# Patient Record
Sex: MALE | Race: WHITE | Employment: OTHER | URBAN - METROPOLITAN AREA
[De-identification: names, ages, dates, MRNs, and addresses within clinical notes are randomized per-mention and may not be internally consistent; named-entity substitution may affect disease eponyms.]

---

## 2017-05-10 PROBLEM — E11.9 CONTROLLED TYPE 2 DIABETES MELLITUS WITHOUT COMPLICATION, WITHOUT LONG-TERM CURRENT USE OF INSULIN (HCC): Status: ACTIVE | Noted: 2017-05-10

## 2020-03-19 PROBLEM — I25.5 ISCHEMIC CARDIOMYOPATHY: Status: ACTIVE | Noted: 2017-06-30

## 2020-03-19 PROBLEM — E11.9 CONTROLLED TYPE 2 DIABETES MELLITUS WITHOUT COMPLICATION, WITHOUT LONG-TERM CURRENT USE OF INSULIN (HCC): Status: RESOLVED | Noted: 2017-05-10 | Resolved: 2020-03-19

## 2021-03-08 PROBLEM — R35.0 FREQUENCY OF URINATION: Status: ACTIVE | Noted: 2018-03-26

## 2021-03-08 PROBLEM — R35.1 NOCTURIA ASSOCIATED WITH BENIGN PROSTATIC HYPERPLASIA: Status: ACTIVE | Noted: 2017-09-25

## 2021-03-08 PROBLEM — Z86.010 PERSONAL HISTORY OF COLONIC POLYPS: Status: ACTIVE | Noted: 2020-01-08

## 2021-03-08 PROBLEM — N40.1 NOCTURIA ASSOCIATED WITH BENIGN PROSTATIC HYPERPLASIA: Status: ACTIVE | Noted: 2017-09-25

## 2021-03-08 PROBLEM — K59.01 SLOW TRANSIT CONSTIPATION: Status: ACTIVE | Noted: 2020-01-08

## 2021-03-18 ENCOUNTER — HOSPITAL ENCOUNTER (OUTPATIENT)
Dept: CT IMAGING | Age: 71
Discharge: HOME | End: 2021-03-18
Attending: FAMILY MEDICINE
Payer: MEDICARE

## 2021-03-18 DIAGNOSIS — R29.818 ROMBERG'S TEST POSITIVE: ICD-10-CM

## 2021-03-18 DIAGNOSIS — R26.0 ATAXIC GAIT: ICD-10-CM

## 2021-03-18 PROCEDURE — 70450 CT HEAD/BRAIN W/O DYE: CPT

## 2021-03-29 NOTE — PROGRESS NOTES
Please inform patient that his CT scan did not show any reason for his chronic ataxia (balance problems) but did show significant sinusitis which may need to be addressed by ENT.

## 2021-03-30 NOTE — PROGRESS NOTES
I called patient to inform them of abnormal lab/radiology results. Patient verbalized understanding on all. I informed him that it did not show any reason for the chronic ataxia though. He is asking that we place the referral to an ENT so he can move forward with them on the sinus issue.

## 2021-04-16 ENCOUNTER — APPOINTMENT (OUTPATIENT)
Dept: CT IMAGING | Age: 71
DRG: 871 | End: 2021-04-16
Attending: EMERGENCY MEDICINE
Payer: MEDICARE

## 2021-04-16 ENCOUNTER — HOSPITAL ENCOUNTER (INPATIENT)
Age: 71
LOS: 6 days | Discharge: HOME HEALTH | DRG: 871 | End: 2021-04-22
Attending: EMERGENCY MEDICINE | Admitting: HOSPITALIST
Payer: MEDICARE

## 2021-04-16 ENCOUNTER — APPOINTMENT (OUTPATIENT)
Dept: GENERAL RADIOLOGY | Age: 71
DRG: 871 | End: 2021-04-16
Attending: EMERGENCY MEDICINE
Payer: MEDICARE

## 2021-04-16 DIAGNOSIS — J18.9 PNEUMONIA OF RIGHT LUNG DUE TO INFECTIOUS ORGANISM, UNSPECIFIED PART OF LUNG: Primary | ICD-10-CM

## 2021-04-16 DIAGNOSIS — J98.4 CAVITARY LUNG DISEASE: ICD-10-CM

## 2021-04-16 DIAGNOSIS — J43.9 PULMONARY EMPHYSEMA, UNSPECIFIED EMPHYSEMA TYPE (HCC): ICD-10-CM

## 2021-04-16 DIAGNOSIS — K21.9 GASTROESOPHAGEAL REFLUX DISEASE WITHOUT ESOPHAGITIS: ICD-10-CM

## 2021-04-16 DIAGNOSIS — I25.5 ISCHEMIC CARDIOMYOPATHY: ICD-10-CM

## 2021-04-16 DIAGNOSIS — J96.01 ACUTE RESPIRATORY FAILURE WITH HYPOXIA (HCC): ICD-10-CM

## 2021-04-16 LAB
ALBUMIN SERPL-MCNC: 1.7 G/DL (ref 3.2–4.6)
ALBUMIN/GLOB SERPL: 0.3 {RATIO} (ref 1.2–3.5)
ALP SERPL-CCNC: 203 U/L (ref 50–136)
ALT SERPL-CCNC: 229 U/L (ref 12–65)
ANION GAP SERPL CALC-SCNC: 11 MMOL/L (ref 7–16)
AST SERPL-CCNC: 232 U/L (ref 15–37)
BASOPHILS # BLD: 0.1 K/UL (ref 0–0.2)
BASOPHILS NFR BLD: 0 % (ref 0–2)
BILIRUB SERPL-MCNC: 1 MG/DL (ref 0.2–1.1)
BUN SERPL-MCNC: 33 MG/DL (ref 8–23)
CALCIUM SERPL-MCNC: 8.8 MG/DL (ref 8.3–10.4)
CHLORIDE SERPL-SCNC: 96 MMOL/L (ref 98–107)
CO2 SERPL-SCNC: 25 MMOL/L (ref 21–32)
CREAT SERPL-MCNC: 1.08 MG/DL (ref 0.8–1.5)
DIFFERENTIAL METHOD BLD: ABNORMAL
EOSINOPHIL # BLD: 0 K/UL (ref 0–0.8)
EOSINOPHIL NFR BLD: 0 % (ref 0.5–7.8)
ERYTHROCYTE [DISTWIDTH] IN BLOOD BY AUTOMATED COUNT: 13.8 % (ref 11.9–14.6)
GLOBULIN SER CALC-MCNC: 6.5 G/DL (ref 2.3–3.5)
GLUCOSE SERPL-MCNC: 107 MG/DL (ref 65–100)
HCT VFR BLD AUTO: 30.9 % (ref 41.1–50.3)
HGB BLD-MCNC: 10.5 G/DL (ref 13.6–17.2)
IMM GRANULOCYTES # BLD AUTO: 0.3 K/UL (ref 0–0.5)
IMM GRANULOCYTES NFR BLD AUTO: 1 % (ref 0–5)
LACTATE SERPL-SCNC: 1.3 MMOL/L (ref 0.4–2)
LYMPHOCYTES # BLD: 1.1 K/UL (ref 0.5–4.6)
LYMPHOCYTES NFR BLD: 4 % (ref 13–44)
MAGNESIUM SERPL-MCNC: 2.2 MG/DL (ref 1.8–2.4)
MCH RBC QN AUTO: 29.5 PG (ref 26.1–32.9)
MCHC RBC AUTO-ENTMCNC: 34 G/DL (ref 31.4–35)
MCV RBC AUTO: 86.8 FL (ref 79.6–97.8)
MONOCYTES # BLD: 1.5 K/UL (ref 0.1–1.3)
MONOCYTES NFR BLD: 5 % (ref 4–12)
NEUTS SEG # BLD: 27.8 K/UL (ref 1.7–8.2)
NEUTS SEG NFR BLD: 90 % (ref 43–78)
NRBC # BLD: 0 K/UL (ref 0–0.2)
PLATELET # BLD AUTO: 758 K/UL (ref 150–450)
PMV BLD AUTO: 8.5 FL (ref 9.4–12.3)
POTASSIUM SERPL-SCNC: 3.9 MMOL/L (ref 3.5–5.1)
PROCALCITONIN SERPL-MCNC: 0.58 NG/ML
PROT SERPL-MCNC: 8.2 G/DL (ref 6.3–8.2)
RBC # BLD AUTO: 3.56 M/UL (ref 4.23–5.6)
SODIUM SERPL-SCNC: 132 MMOL/L (ref 136–145)
WBC # BLD AUTO: 30.8 K/UL (ref 4.3–11.1)

## 2021-04-16 PROCEDURE — 96365 THER/PROPH/DIAG IV INF INIT: CPT

## 2021-04-16 PROCEDURE — 87040 BLOOD CULTURE FOR BACTERIA: CPT

## 2021-04-16 PROCEDURE — 74011250636 HC RX REV CODE- 250/636: Performed by: HOSPITALIST

## 2021-04-16 PROCEDURE — 84145 PROCALCITONIN (PCT): CPT

## 2021-04-16 PROCEDURE — 65270000029 HC RM PRIVATE

## 2021-04-16 PROCEDURE — 85025 COMPLETE CBC W/AUTO DIFF WBC: CPT

## 2021-04-16 PROCEDURE — 74011000258 HC RX REV CODE- 258: Performed by: HOSPITALIST

## 2021-04-16 PROCEDURE — 74011250636 HC RX REV CODE- 250/636: Performed by: EMERGENCY MEDICINE

## 2021-04-16 PROCEDURE — 83605 ASSAY OF LACTIC ACID: CPT

## 2021-04-16 PROCEDURE — 93005 ELECTROCARDIOGRAM TRACING: CPT | Performed by: EMERGENCY MEDICINE

## 2021-04-16 PROCEDURE — 80053 COMPREHEN METABOLIC PANEL: CPT

## 2021-04-16 PROCEDURE — 74011000258 HC RX REV CODE- 258: Performed by: EMERGENCY MEDICINE

## 2021-04-16 PROCEDURE — 71260 CT THORAX DX C+: CPT

## 2021-04-16 PROCEDURE — 74011000636 HC RX REV CODE- 636: Performed by: EMERGENCY MEDICINE

## 2021-04-16 PROCEDURE — 71045 X-RAY EXAM CHEST 1 VIEW: CPT

## 2021-04-16 PROCEDURE — 83735 ASSAY OF MAGNESIUM: CPT

## 2021-04-16 PROCEDURE — 99285 EMERGENCY DEPT VISIT HI MDM: CPT

## 2021-04-16 RX ORDER — VANCOMYCIN HYDROCHLORIDE
1250 EVERY 12 HOURS
Status: DISCONTINUED | OUTPATIENT
Start: 2021-04-17 | End: 2021-04-22 | Stop reason: HOSPADM

## 2021-04-16 RX ORDER — VANCOMYCIN 2 GRAM/500 ML IN 0.9 % SODIUM CHLORIDE INTRAVENOUS
2000 ONCE
Status: COMPLETED | OUTPATIENT
Start: 2021-04-16 | End: 2021-04-16

## 2021-04-16 RX ORDER — SODIUM CHLORIDE 0.9 % (FLUSH) 0.9 %
10 SYRINGE (ML) INJECTION
Status: COMPLETED | OUTPATIENT
Start: 2021-04-16 | End: 2021-04-16

## 2021-04-16 RX ORDER — SODIUM CHLORIDE 9 MG/ML
125 INJECTION, SOLUTION INTRAVENOUS CONTINUOUS
Status: DISCONTINUED | OUTPATIENT
Start: 2021-04-16 | End: 2021-04-17

## 2021-04-16 RX ADMIN — SODIUM CHLORIDE 100 ML: 900 INJECTION, SOLUTION INTRAVENOUS at 18:49

## 2021-04-16 RX ADMIN — Medication 10 ML: at 18:49

## 2021-04-16 RX ADMIN — PIPERACILLIN SODIUM AND TAZOBACTAM SODIUM 3.38 G: 3; .375 INJECTION, POWDER, LYOPHILIZED, FOR SOLUTION INTRAVENOUS at 21:09

## 2021-04-16 RX ADMIN — IOPAMIDOL 80 ML: 755 INJECTION, SOLUTION INTRAVENOUS at 18:49

## 2021-04-16 RX ADMIN — VANCOMYCIN HYDROCHLORIDE 2000 MG: 10 INJECTION, POWDER, LYOPHILIZED, FOR SOLUTION INTRAVENOUS at 21:37

## 2021-04-16 RX ADMIN — SODIUM CHLORIDE 125 ML/HR: 900 INJECTION, SOLUTION INTRAVENOUS at 19:17

## 2021-04-16 RX ADMIN — CEFTRIAXONE 1 G: 1 INJECTION, POWDER, FOR SOLUTION INTRAMUSCULAR; INTRAVENOUS at 19:17

## 2021-04-16 NOTE — ED TRIAGE NOTES
Pt arrives via EMS from home c/o generalized weakness and not feeling well. 118/72, 88hr, 94% RA with hx of COPD. 97.5 oral. bgl 129. A/ox4. Recently had a covid vaccine. Pt able to walk to stretcher.

## 2021-04-16 NOTE — ED PROVIDER NOTES
52-year-old gentleman presents with concerns about shortness of breath and right-sided chest pain. Patient said that he had a Covid vaccine a week ago and then he felt \"flat on my face\". He said since that fall he was having some right sided chest pain and shortness of breath. He denies any fevers or chills. He says he has had a mild cough. He said he quit smoking 20 years ago. That was when he had bypass surgery. He does have a history of some COPD and some congestive heart failure. No other associated symptoms including no nausea or vomiting. Elements of this note were created using speech recognition software. As such, errors of speech recognition may be present.            Past Medical History:   Diagnosis Date    CAD (coronary artery disease)     CABG 2001, MI 5-2001    Callus of foot     history of removal    Chronic obstructive pulmonary disease (HCC)     Coronary atherosclerosis of native coronary vessel 12/28/2015    GERD (gastroesophageal reflux disease)     History of dental examination 01/01/2017    History of heart failure     History of palpitations     Hypertension     OA (osteoarthritis)     Pacemaker     S/P CABG (coronary artery bypass graft)     S/P coronary artery bypass graft x 3     Ventricular arrhythmia        Past Surgical History:   Procedure Laterality Date    HX CATARACT REMOVAL      2-2017 RIGHT EYE AND 3-2017 LEFT EYE    HX HEENT      tooth extraction    HX ORTHOPAEDIC Right     foot callus    HX PACEMAKER      ICD    [place] CARDIAC SURG PROCEDURE UNLIST      cabg x3     VASCULAR SURGERY PROCEDURE UNLIST           Family History:   Problem Relation Age of Onset    Hypertension Father     Heart Disease Brother     Hypertension Brother     No Known Problems Mother     Coronary Artery Disease Other         family hx    No Known Problems Maternal Grandmother     No Known Problems Maternal Grandfather     No Known Problems Paternal Grandmother     No Known Problems Paternal Grandfather        Social History     Socioeconomic History    Marital status:      Spouse name: Not on file    Number of children: Not on file    Years of education: Not on file    Highest education level: Not on file   Occupational History    Not on file   Social Needs    Financial resource strain: Not on file    Food insecurity     Worry: Not on file     Inability: Not on file    Transportation needs     Medical: Not on file     Non-medical: Not on file   Tobacco Use    Smoking status: Former Smoker     Packs/day: 1.50     Years: 30.00     Pack years: 45.00     Types: Cigarettes     Quit date: 4/15/2001     Years since quittin.0    Smokeless tobacco: Never Used   Substance and Sexual Activity    Alcohol use: Yes     Frequency: 2-4 times a month     Drinks per session: 3 or 4     Binge frequency: Never     Comment: social, 3 or less per week    Drug use: No    Sexual activity: Not Currently     Partners: Female   Lifestyle    Physical activity     Days per week: Not on file     Minutes per session: Not on file    Stress: Not on file   Relationships    Social connections     Talks on phone: Not on file     Gets together: Not on file     Attends Evangelical service: Not on file     Active member of club or organization: Not on file     Attends meetings of clubs or organizations: Not on file     Relationship status: Not on file    Intimate partner violence     Fear of current or ex partner: Not on file     Emotionally abused: Not on file     Physically abused: Not on file     Forced sexual activity: Not on file   Other Topics Concern    Not on file   Social History Narrative    Not on file         ALLERGIES: Ventolin [albuterol sulfate], Amiodarone, Inspra [eplerenone], Niaspan [niacin], Nitroglycerin, and Other medication    Review of Systems   Constitutional: Negative for chills, diaphoresis and fever. HENT: Negative for congestion, rhinorrhea and sore throat. Eyes: Negative for redness and visual disturbance. Respiratory: Positive for cough and shortness of breath. Negative for chest tightness and wheezing. Cardiovascular: Positive for chest pain. Negative for palpitations. Gastrointestinal: Negative for abdominal pain, blood in stool, diarrhea, nausea and vomiting. Endocrine: Negative for polydipsia and polyuria. Genitourinary: Negative for dysuria and hematuria. Musculoskeletal: Negative for arthralgias, myalgias and neck stiffness. Skin: Negative for rash. Allergic/Immunologic: Negative for environmental allergies and food allergies. Neurological: Negative for dizziness, weakness and headaches. Hematological: Negative for adenopathy. Does not bruise/bleed easily. Psychiatric/Behavioral: Negative for confusion and sleep disturbance. The patient is not nervous/anxious. Vitals:    04/16/21 1549 04/16/21 1746   BP: 93/63 104/63   Pulse: 81    Resp: 17    Temp: 98.6 °F (37 °C)    SpO2: 92% 93%   Weight: 87.1 kg (192 lb)    Height: 5' 9\" (1.753 m)             Physical Exam  Vitals signs and nursing note reviewed. Constitutional:       Appearance: Normal appearance. HENT:      Head: Normocephalic and atraumatic. Cardiovascular:      Rate and Rhythm: Normal rate and regular rhythm. Pulmonary:      Effort: No respiratory distress. Comments: Coarse bilateral breath sounds  Abdominal:      General: Bowel sounds are normal.      Palpations: Abdomen is soft. Neurological:      General: No focal deficit present. Mental Status: He is alert and oriented to person, place, and time. MDM  Number of Diagnoses or Management Options  Diagnosis management comments: Patient has what appears to be a large infiltrate in his right upper lobe. His white count is 30. I will get blood cultures and treat with IV Rocephin.   I will also get a CT of his chest.         Procedures

## 2021-04-16 NOTE — ED NOTES
Patient signed to me by Dr. Chanda Ortega for CT result and hospitalist admission. CT shows large area of consolidation with cavitation, adenopathy. Pneumonia / possible malignancy. Patient had [address] [place] [address] Avenue about 12 days ago. States he has a chronic cough 4 years that he attributes to his sinuses. Consult hospitalist for admission. Perfect Serve Zenaida Cope. Will see.

## 2021-04-17 LAB
ALBUMIN SERPL-MCNC: 1.5 G/DL (ref 3.2–4.6)
ALBUMIN/GLOB SERPL: 0.3 {RATIO} (ref 1.2–3.5)
ALP SERPL-CCNC: 199 U/L (ref 50–136)
ALT SERPL-CCNC: 223 U/L (ref 12–65)
ANION GAP SERPL CALC-SCNC: 9 MMOL/L (ref 7–16)
AST SERPL-CCNC: 285 U/L (ref 15–37)
ATRIAL RATE: 89 BPM
BASOPHILS # BLD: 0 K/UL (ref 0–0.2)
BASOPHILS NFR BLD: 0 % (ref 0–2)
BILIRUB SERPL-MCNC: 1.1 MG/DL (ref 0.2–1.1)
BUN SERPL-MCNC: 26 MG/DL (ref 8–23)
CALCIUM SERPL-MCNC: 8.6 MG/DL (ref 8.3–10.4)
CALCULATED P AXIS, ECG09: 68 DEGREES
CALCULATED R AXIS, ECG10: -63 DEGREES
CALCULATED T AXIS, ECG11: 88 DEGREES
CHLORIDE SERPL-SCNC: 100 MMOL/L (ref 98–107)
CO2 SERPL-SCNC: 25 MMOL/L (ref 21–32)
CREAT SERPL-MCNC: 0.91 MG/DL (ref 0.8–1.5)
DIAGNOSIS, 93000: NORMAL
DIFFERENTIAL METHOD BLD: ABNORMAL
EOSINOPHIL # BLD: 0.1 K/UL (ref 0–0.8)
EOSINOPHIL NFR BLD: 0 % (ref 0.5–7.8)
ERYTHROCYTE [DISTWIDTH] IN BLOOD BY AUTOMATED COUNT: 13.9 % (ref 11.9–14.6)
GLOBULIN SER CALC-MCNC: 5.9 G/DL (ref 2.3–3.5)
GLUCOSE BLD STRIP.AUTO-MCNC: 101 MG/DL (ref 65–100)
GLUCOSE SERPL-MCNC: 97 MG/DL (ref 65–100)
HCT VFR BLD AUTO: 28.2 % (ref 41.1–50.3)
HGB BLD-MCNC: 9.6 G/DL (ref 13.6–17.2)
IMM GRANULOCYTES # BLD AUTO: 0.3 K/UL (ref 0–0.5)
IMM GRANULOCYTES NFR BLD AUTO: 1 % (ref 0–5)
LYMPHOCYTES # BLD: 1.1 K/UL (ref 0.5–4.6)
LYMPHOCYTES NFR BLD: 4 % (ref 13–44)
MAGNESIUM SERPL-MCNC: 2.1 MG/DL (ref 1.8–2.4)
MCH RBC QN AUTO: 29.2 PG (ref 26.1–32.9)
MCHC RBC AUTO-ENTMCNC: 34 G/DL (ref 31.4–35)
MCV RBC AUTO: 85.7 FL (ref 79.6–97.8)
MONOCYTES # BLD: 1.3 K/UL (ref 0.1–1.3)
MONOCYTES NFR BLD: 5 % (ref 4–12)
NEUTS SEG # BLD: 24.1 K/UL (ref 1.7–8.2)
NEUTS SEG NFR BLD: 90 % (ref 43–78)
NRBC # BLD: 0 K/UL (ref 0–0.2)
P-R INTERVAL, ECG05: 168 MS
PLATELET # BLD AUTO: 682 K/UL (ref 150–450)
PMV BLD AUTO: 8.7 FL (ref 9.4–12.3)
POTASSIUM SERPL-SCNC: 3.5 MMOL/L (ref 3.5–5.1)
PROT SERPL-MCNC: 7.4 G/DL (ref 6.3–8.2)
Q-T INTERVAL, ECG07: 434 MS
QRS DURATION, ECG06: 122 MS
QTC CALCULATION (BEZET), ECG08: 528 MS
RBC # BLD AUTO: 3.29 M/UL (ref 4.23–5.6)
SERVICE CMNT-IMP: ABNORMAL
SODIUM SERPL-SCNC: 134 MMOL/L (ref 136–145)
VENTRICULAR RATE, ECG03: 89 BPM
WBC # BLD AUTO: 26.9 K/UL (ref 4.3–11.1)

## 2021-04-17 PROCEDURE — 74011000302 HC RX REV CODE- 302: Performed by: INTERNAL MEDICINE

## 2021-04-17 PROCEDURE — 87040 BLOOD CULTURE FOR BACTERIA: CPT

## 2021-04-17 PROCEDURE — 97161 PT EVAL LOW COMPLEX 20 MIN: CPT

## 2021-04-17 PROCEDURE — 94640 AIRWAY INHALATION TREATMENT: CPT

## 2021-04-17 PROCEDURE — 2709999900 HC NON-CHARGEABLE SUPPLY

## 2021-04-17 PROCEDURE — 77010033678 HC OXYGEN DAILY

## 2021-04-17 PROCEDURE — 74011000258 HC RX REV CODE- 258: Performed by: HOSPITALIST

## 2021-04-17 PROCEDURE — 74011250636 HC RX REV CODE- 250/636: Performed by: HOSPITALIST

## 2021-04-17 PROCEDURE — 74011250637 HC RX REV CODE- 250/637: Performed by: HOSPITALIST

## 2021-04-17 PROCEDURE — 65270000029 HC RM PRIVATE

## 2021-04-17 PROCEDURE — 97530 THERAPEUTIC ACTIVITIES: CPT

## 2021-04-17 PROCEDURE — 80053 COMPREHEN METABOLIC PANEL: CPT

## 2021-04-17 PROCEDURE — 82962 GLUCOSE BLOOD TEST: CPT

## 2021-04-17 PROCEDURE — 85025 COMPLETE CBC W/AUTO DIFF WBC: CPT

## 2021-04-17 PROCEDURE — 83735 ASSAY OF MAGNESIUM: CPT

## 2021-04-17 PROCEDURE — 94760 N-INVAS EAR/PLS OXIMETRY 1: CPT

## 2021-04-17 PROCEDURE — 36415 COLL VENOUS BLD VENIPUNCTURE: CPT

## 2021-04-17 PROCEDURE — 86580 TB INTRADERMAL TEST: CPT | Performed by: INTERNAL MEDICINE

## 2021-04-17 PROCEDURE — 99223 1ST HOSP IP/OBS HIGH 75: CPT | Performed by: INTERNAL MEDICINE

## 2021-04-17 RX ORDER — FLUTICASONE PROPIONATE 50 MCG
2 SPRAY, SUSPENSION (ML) NASAL DAILY
Status: DISCONTINUED | OUTPATIENT
Start: 2021-04-17 | End: 2021-04-22 | Stop reason: HOSPADM

## 2021-04-17 RX ORDER — FACIAL-BODY WIPES
10 EACH TOPICAL DAILY PRN
Status: DISCONTINUED | OUTPATIENT
Start: 2021-04-17 | End: 2021-04-22 | Stop reason: HOSPADM

## 2021-04-17 RX ORDER — EZETIMIBE 10 MG/1
10 TABLET ORAL DAILY
Status: DISCONTINUED | OUTPATIENT
Start: 2021-04-17 | End: 2021-04-22 | Stop reason: HOSPADM

## 2021-04-17 RX ORDER — SODIUM CHLORIDE 0.9 % (FLUSH) 0.9 %
5-40 SYRINGE (ML) INJECTION EVERY 8 HOURS
Status: DISCONTINUED | OUTPATIENT
Start: 2021-04-17 | End: 2021-04-22 | Stop reason: HOSPADM

## 2021-04-17 RX ORDER — HYDROCHLOROTHIAZIDE 25 MG/1
25 TABLET ORAL DAILY
Status: DISCONTINUED | OUTPATIENT
Start: 2021-04-17 | End: 2021-04-22 | Stop reason: HOSPADM

## 2021-04-17 RX ORDER — SODIUM CHLORIDE 0.9 % (FLUSH) 0.9 %
5-40 SYRINGE (ML) INJECTION AS NEEDED
Status: DISCONTINUED | OUTPATIENT
Start: 2021-04-17 | End: 2021-04-22 | Stop reason: HOSPADM

## 2021-04-17 RX ORDER — SOTALOL HYDROCHLORIDE 80 MG/1
120 TABLET ORAL 2 TIMES DAILY
Status: DISCONTINUED | OUTPATIENT
Start: 2021-04-17 | End: 2021-04-22 | Stop reason: HOSPADM

## 2021-04-17 RX ORDER — CARVEDILOL 12.5 MG/1
12.5 TABLET ORAL 2 TIMES DAILY
Status: DISCONTINUED | OUTPATIENT
Start: 2021-04-17 | End: 2021-04-22 | Stop reason: HOSPADM

## 2021-04-17 RX ORDER — ENOXAPARIN SODIUM 100 MG/ML
40 INJECTION SUBCUTANEOUS DAILY
Status: DISCONTINUED | OUTPATIENT
Start: 2021-04-17 | End: 2021-04-22 | Stop reason: HOSPADM

## 2021-04-17 RX ORDER — ACETAMINOPHEN 325 MG/1
650 TABLET ORAL
Status: DISCONTINUED | OUTPATIENT
Start: 2021-04-17 | End: 2021-04-22 | Stop reason: HOSPADM

## 2021-04-17 RX ORDER — ADHESIVE BANDAGE
30 BANDAGE TOPICAL DAILY PRN
Status: DISCONTINUED | OUTPATIENT
Start: 2021-04-17 | End: 2021-04-22 | Stop reason: HOSPADM

## 2021-04-17 RX ORDER — PANTOPRAZOLE SODIUM 40 MG/1
40 TABLET, DELAYED RELEASE ORAL
Status: DISCONTINUED | OUTPATIENT
Start: 2021-04-17 | End: 2021-04-22 | Stop reason: HOSPADM

## 2021-04-17 RX ORDER — MEXILETINE HYDROCHLORIDE 200 MG/1
200 CAPSULE ORAL 2 TIMES DAILY
Status: DISCONTINUED | OUTPATIENT
Start: 2021-04-17 | End: 2021-04-22 | Stop reason: HOSPADM

## 2021-04-17 RX ORDER — ACETAMINOPHEN 650 MG/1
650 SUPPOSITORY RECTAL
Status: DISCONTINUED | OUTPATIENT
Start: 2021-04-17 | End: 2021-04-22 | Stop reason: HOSPADM

## 2021-04-17 RX ORDER — BISACODYL 5 MG
5 TABLET, DELAYED RELEASE (ENTERIC COATED) ORAL DAILY PRN
Status: DISCONTINUED | OUTPATIENT
Start: 2021-04-17 | End: 2021-04-22 | Stop reason: HOSPADM

## 2021-04-17 RX ORDER — RANOLAZINE 500 MG/1
500 TABLET, EXTENDED RELEASE ORAL 2 TIMES DAILY
Status: DISCONTINUED | OUTPATIENT
Start: 2021-04-17 | End: 2021-04-22 | Stop reason: HOSPADM

## 2021-04-17 RX ORDER — MELATONIN
2000 DAILY
Status: DISCONTINUED | OUTPATIENT
Start: 2021-04-17 | End: 2021-04-22 | Stop reason: HOSPADM

## 2021-04-17 RX ORDER — ATORVASTATIN CALCIUM 20 MG/1
20 TABLET, FILM COATED ORAL DAILY
Status: DISCONTINUED | OUTPATIENT
Start: 2021-04-17 | End: 2021-04-22 | Stop reason: HOSPADM

## 2021-04-17 RX ORDER — BUDESONIDE AND FORMOTEROL FUMARATE DIHYDRATE 160; 4.5 UG/1; UG/1
2 AEROSOL RESPIRATORY (INHALATION)
Status: DISCONTINUED | OUTPATIENT
Start: 2021-04-17 | End: 2021-04-22 | Stop reason: HOSPADM

## 2021-04-17 RX ORDER — ASPIRIN 81 MG/1
81 TABLET ORAL DAILY
Status: DISCONTINUED | OUTPATIENT
Start: 2021-04-17 | End: 2021-04-22 | Stop reason: HOSPADM

## 2021-04-17 RX ORDER — POLYETHYLENE GLYCOL 3350 17 G/17G
17 POWDER, FOR SOLUTION ORAL DAILY
Status: DISCONTINUED | OUTPATIENT
Start: 2021-04-17 | End: 2021-04-22 | Stop reason: HOSPADM

## 2021-04-17 RX ORDER — AMOXICILLIN 250 MG
1 CAPSULE ORAL 2 TIMES DAILY
Status: DISCONTINUED | OUTPATIENT
Start: 2021-04-17 | End: 2021-04-22 | Stop reason: HOSPADM

## 2021-04-17 RX ORDER — LISINOPRIL 5 MG/1
5 TABLET ORAL DAILY
Status: DISCONTINUED | OUTPATIENT
Start: 2021-04-17 | End: 2021-04-22 | Stop reason: HOSPADM

## 2021-04-17 RX ORDER — NITROGLYCERIN 0.4 MG/1
0.4 TABLET SUBLINGUAL AS NEEDED
Status: DISCONTINUED | OUTPATIENT
Start: 2021-04-17 | End: 2021-04-22 | Stop reason: HOSPADM

## 2021-04-17 RX ADMIN — Medication 10 ML: at 14:00

## 2021-04-17 RX ADMIN — PIPERACILLIN SODIUM AND TAZOBACTAM SODIUM 3.38 G: 3; .375 INJECTION, POWDER, LYOPHILIZED, FOR SOLUTION INTRAVENOUS at 04:22

## 2021-04-17 RX ADMIN — PIPERACILLIN SODIUM AND TAZOBACTAM SODIUM 3.38 G: 3; .375 INJECTION, POWDER, LYOPHILIZED, FOR SOLUTION INTRAVENOUS at 12:33

## 2021-04-17 RX ADMIN — MEXILETINE HYDROCHLORIDE 200 MG: 200 CAPSULE ORAL at 09:00

## 2021-04-17 RX ADMIN — Medication 10 ML: at 06:00

## 2021-04-17 RX ADMIN — POLYETHYLENE GLYCOL 3350 17 G: 17 POWDER, FOR SOLUTION ORAL at 08:18

## 2021-04-17 RX ADMIN — VANCOMYCIN HYDROCHLORIDE 1250 MG: 10 INJECTION, POWDER, LYOPHILIZED, FOR SOLUTION INTRAVENOUS at 22:20

## 2021-04-17 RX ADMIN — TIOTROPIUM BROMIDE INHALATION SPRAY 2 PUFF: 3.12 SPRAY, METERED RESPIRATORY (INHALATION) at 12:35

## 2021-04-17 RX ADMIN — TUBERCULIN PURIFIED PROTEIN DERIVATIVE 5 UNITS: 5 INJECTION, SOLUTION INTRADERMAL at 12:45

## 2021-04-17 RX ADMIN — SOTALOL HYDROCHLORIDE 120 MG: 80 TABLET ORAL at 08:18

## 2021-04-17 RX ADMIN — SENNOSIDES AND DOCUSATE SODIUM 1 TABLET: 8.6; 5 TABLET ORAL at 21:30

## 2021-04-17 RX ADMIN — ENOXAPARIN SODIUM 40 MG: 40 INJECTION SUBCUTANEOUS at 08:19

## 2021-04-17 RX ADMIN — CARVEDILOL 12.5 MG: 12.5 TABLET, FILM COATED ORAL at 17:53

## 2021-04-17 RX ADMIN — RANOLAZINE 500 MG: 500 TABLET, FILM COATED, EXTENDED RELEASE ORAL at 17:53

## 2021-04-17 RX ADMIN — PANTOPRAZOLE SODIUM 40 MG: 40 TABLET, DELAYED RELEASE ORAL at 06:30

## 2021-04-17 RX ADMIN — SOTALOL HYDROCHLORIDE 120 MG: 80 TABLET ORAL at 17:52

## 2021-04-17 RX ADMIN — Medication 10 ML: at 02:00

## 2021-04-17 RX ADMIN — VANCOMYCIN HYDROCHLORIDE 1250 MG: 10 INJECTION, POWDER, LYOPHILIZED, FOR SOLUTION INTRAVENOUS at 12:34

## 2021-04-17 RX ADMIN — PIPERACILLIN SODIUM AND TAZOBACTAM SODIUM 3.38 G: 3; .375 INJECTION, POWDER, LYOPHILIZED, FOR SOLUTION INTRAVENOUS at 21:30

## 2021-04-17 RX ADMIN — Medication 10 ML: at 21:30

## 2021-04-17 NOTE — H&P
[facility]  History and Physical    Patient ID:  Herman Fairchild  male  1950  [id number]    Admission Date: 4/16/2021  Chief Complaint: Shortness of breath and cough  Reason for Admission: Pneumonia, cavitary lesion of the lung    ASSESSMENT & PLAN:    Cavitary lesion of the lungbroad differentials, follow-up QuantiFERON, Aspergillus ag, pulmonary consultations, start airborne isolation precaution for now until seen by pulmonary    Severe sepsis secondary to suspected bacterial pneumoniaelevated procalcitonin, start empiric vancomycin and Zosyn, follow cultures,       Disposition: gen med inpatient  Diet: regular   VTE ppx: lovenox  GI ppx: ppi  CODE STATUS: DNR, okay to intubate, per pt's request on admission  Surrogate decision-maker: brother mr white     HISTORY OF PRESENT ILLNESS:  Patient is a 72 y.o. male with medical h/o history of chronic systolic heart failure, status post AICD, COPD, hypertensions, CAD, status post CABG, hyperlipidemia, who presented to [facility] ED with shortness of breath and cough, patient had COVID-19 vaccine 1 week ago, thought it was a contributory factor, patient status post CT scanning, found to have a newly diagnosed cavitary lesion with large areas of right upper lobe consolidations, in bibasilar atelectasis, patient denies sick contact, denies recent travel,          Allergies   Allergen Reactions    Ventolin [Albuterol Sulfate] Other (comments)     I used it once and woke up in the kitchen floor after my defibrillator went off.  Amiodarone Other (comments)     STUMBLES     Inspra [Eplerenone] Hives    Niaspan [Niacin] Hives    Nitroglycerin Other (comments)     SEVERE HYPOTENSION      Other Medication Anaphylaxis     RASPBERRIES        Prior to Admission Medications   Prescriptions Last Dose Informant Patient Reported? Taking? B.infantis-B.ani-B.long-B.bifi (Probiotic 4X) 10-15 mg TbEC   Yes No   Sig: Take  by mouth.    aspirin delayed-release 81 mg tablet   Yes No   Sig: Take 81 mg by mouth daily. bisacodyL (Dulcolax, bisacodyl,) 5 mg EC tablet   Yes No   Sig: Take 5 mg by mouth daily as needed for Constipation. carvediloL (COREG) 12.5 mg tablet   No No   Sig: Take 1 Tab by mouth two (2) times a day. cholecalciferol (Vitamin D3) 25 mcg (1,000 unit) cap   Yes No   Sig: Take  by mouth daily. ezetimibe (ZETIA) 10 mg tablet   No No   Sig: TAKE 1 TABLET DAILY   fluticasone propion-salmeteroL (Advair Diskus) 250-50 mcg/dose diskus inhaler   No No   Sig: USE 1 INHALATION TWICE A DAY IN THE MORNING AND EVENING APPROXIMATELY 12 HOURS APART   fluticasone propionate (FLONASE) 50 mcg/actuation nasal spray   No No   Si Sprays by Both Nostrils route daily. hydroCHLOROthiazide (HYDRODIURIL) 25 mg tablet   No No   Sig: Take 1 Tab by mouth daily. lisinopriL (PRINIVIL, ZESTRIL) 5 mg tablet   No No   Sig: Take 1 Tab by mouth daily. mexiletine (MEXITIL) 200 mg capsule   No No   Sig: Take 1 Cap by mouth two (2) times a day. multivitamin (ONE A DAY) tablet   Yes No   Sig: Take 1 Tab by mouth daily. nitroglycerin (NITROSTAT) 0.4 mg SL tablet   Yes No   Sig: by SubLINGual route every five (5) minutes as needed. pantoprazole (PROTONIX) 40 mg tablet   No No   Sig: TAKE 1 TABLET DAILY   ranolazine ER (Ranexa) 500 mg SR tablet   No No   Sig: Take 1 Tab by mouth two (2) times a day. simvastatin (ZOCOR) 40 mg tablet   No No   Sig: TAKE 1 TABLET NIGHTLY   sotaloL (BETAPACE) 120 mg tablet   No No   Sig: Take 1 Tab by mouth two (2) times a day.    tiotropium (Spiriva with HandiHaler) 18 mcg inhalation capsule   No No   Sig: INHALE THE CONTENTS OF 1 CAPSULE DAILY      Facility-Administered Medications: None       Past Medical History:   Diagnosis Date    CAD (coronary artery disease)     CABG , MI 5-    Callus of foot     history of removal    Chronic obstructive pulmonary disease (HCC)     Coronary atherosclerosis of native coronary vessel 2015    GERD (gastroesophageal reflux disease)     History of dental examination 2017    History of heart failure     History of palpitations     Hypertension     OA (osteoarthritis)     Pacemaker     S/P CABG (coronary artery bypass graft)     S/P coronary artery bypass graft x 3     Ventricular arrhythmia      Past Surgical History:   Procedure Laterality Date    HX CATARACT REMOVAL       RIGHT EYE AND 3-2017 LEFT EYE    HX HEENT      tooth extraction    HX ORTHOPAEDIC Right     foot callus    HX PACEMAKER      ICD    CT CARDIAC SURG PROCEDURE UNLIST      cabg x3     VASCULAR SURGERY PROCEDURE UNLIST         Social History     Tobacco Use    Smoking status: Former Smoker     Packs/day: 1.50     Years: 30.00     Pack years: 45.00     Types: Cigarettes     Quit date: 4/15/2001     Years since quittin.0    Smokeless tobacco: Never Used   Substance Use Topics    Alcohol use: Yes     Frequency: 2-4 times a month     Drinks per session: 3 or 4     Binge frequency: Never     Comment: social, 3 or less per week       FAMILY HISTORY:     Family History   Problem Relation Age of Onset    Hypertension Father     Heart Disease Brother     Hypertension Brother     No Known Problems Mother     Coronary Artery Disease Other         family hx    No Known Problems Maternal Grandmother     No Known Problems Maternal Grandfather     No Known Problems Paternal Grandmother     No Known Problems Paternal Grandfather        REVIEW OF SYSTEMS:  A 14 point review of systems was taken and pertinent positive as per HPI.       PHYSICAL EXAM:    Visit Vitals  /63   Pulse 90   Temp 98.6 °F (37 °C)   Resp 18   Ht 5' 9\" (1.753 m)   Wt 87.1 kg (192 lb)   SpO2 92%   BMI 28.35 kg/m²       General: No acute distress, speaking in full sentences, no use of accessory muscles   HEENT: Pupils equal and reactive to light and accommodation, oropharynx is clear   Neck: Supple, no lymphadenopathy, no JVD   Lungs: Faint rhonchi especially in the right upper lung field   cardiovascular: Regular rate and rhythm with normal S1 and S2   Abdomen: Soft, nontender, nondistended, normoactive bowel sounds   Extremities: No cyanosis clubbing or edema   Neuro: Nonfocal, A&O x3   Psych: Normal mood and affect     Intake/Output last 3 shifts:  Date 04/15/21 1900 - 04/16/21 0659(Not Admitted) 04/16/21 0700 - 04/17/21 0659   Shift 5728-4602 24 Hour Total 9856-4449 1948-6283 24 Hour Total   INTAKE   I.V.   100(0.1) 150 250     Volume (sodium chloride 0.9 % bolus infusion 100 mL)   100  100     Volume (piperacillin-tazobactam (ZOSYN) 3.375 g in 0.9% sodium chloride (MBP/ADV) 100 mL MBP)    100 100     Volume (cefTRIAXone (ROCEPHIN) 1 g in 0.9% sodium chloride (MBP/ADV) 50 mL MBP)    50 50   Shift Total(mL/kg)   100(1.1) 150(1.7) 250(2.9)   OUTPUT   Shift Total(mL/kg)        NET   100 150 250   Weight (kg)   87.1 87.1 87.1         Labs:  CMP:   Lab Results   Component Value Date/Time     (L) 04/16/2021 03:53 PM    K 3.9 04/16/2021 03:53 PM    CL 96 (L) 04/16/2021 03:53 PM    CO2 25 04/16/2021 03:53 PM    AGAP 11 04/16/2021 03:53 PM     (H) 04/16/2021 03:53 PM    BUN 33 (H) 04/16/2021 03:53 PM    CREA 1.08 04/16/2021 03:53 PM    GFRAA >60 04/16/2021 03:53 PM    GFRNA >60 04/16/2021 03:53 PM    CA 8.8 04/16/2021 03:53 PM    MG 2.2 04/16/2021 03:53 PM    ALB 1.7 (L) 04/16/2021 03:53 PM    TBILI 1.0 04/16/2021 03:53 PM    TP 8.2 04/16/2021 03:53 PM    GLOB 6.5 (H) 04/16/2021 03:53 PM    AGRAT 0.3 (L) 04/16/2021 03:53 PM     (H) 04/16/2021 03:53 PM         CBC:    Lab Results   Component Value Date/Time    WBC 30.8 (H) 04/16/2021 03:53 PM    HGB 10.5 (L) 04/16/2021 03:53 PM    HCT 30.9 (L) 04/16/2021 03:53 PM     (H) 04/16/2021 03:53 PM       Lab Results   Component Value Date/Time    INR 0.9 05/28/2015 12:22 PM    INR 1.0 07/10/2012 04:26 PM    INR 1.0 11/09/2011 10:23 AM    Prothrombin time 9.7 05/28/2015 12:22 PM    Prothrombin time 11.8 07/10/2012 04:26 PM    Prothrombin time 11.9 11/09/2011 10:23 AM       ABG:  No results found for: PH, PHI, PCO2, PCO2I, PO2, PO2I, HCO3, HCO3I, FIO2, FIO2I        Lab Results   Component Value Date/Time    Troponin-I, Qt. <0.02 (L) 07/22/2016 02:00 PM    BNP 57 12/02/2015 02:11 PM         Imaging & Other Studies:    XR Results (maximum last 3): Results from Hospital Encounter encounter on 04/16/21   XR CHEST PORT    Narrative Portable chest x-ray    CLINICAL INDICATION: Weakness and cough        FINDINGS: Single AP view the chest compared to a similar exam dated 7/20/2016  shows new dense airspace opacity throughout the right upper lobe extending to  the right lung apex. The left lung is clear. Post CABG changes noted. AICD is in  place. Impression New dense airspace consolidation in the right upper lobe concerning  for pneumonia. Results from [place] encounter on 07/22/16   XR CHEST PA LAT    Narrative History: COUGH, dizziness, nausea, palpitations, 3 weeks duration. Two views chest    COMPARISON: 1/14/2016    Findings: Stable postsurgical changes noted. The lungs are well expanded and  clear. The cardiac silhouette, and mediastinal contour, and osseous structures  are normal. There is some calcification noted along the left pleural surface. There is a stable granuloma within the right midlung. Impression Impression: Stable 2 views chest. No acute abnormality. Results from Hospital Encounter encounter on 01/14/16   XR CHEST PORT    Narrative Chest portable    CLINICAL INDICATION: Tachycardia acute    COMPARISON: 12/6/2015    TECHNIQUE: single AP portable view chest at 6:23 PM     FINDINGS:  There is no evidence of consolidation, pneumothorax, pleural effusion  or pulmonary edema. Mild chronic linear densities in the lung bases and right  upper lobe calcified granuloma are stable compatible with atelectasis and/or  scarring. Pacemaker/ICD is stable. The mediastinal and hilar contours are stable  given technique, with previous CABG again noted. Impression IMPRESSION: No acute disease. Stable ICD, and prior CABG. CT Results (maximum last 3): Results from [place] encounter on 04/16/21   CT CHEST W CONT    Narrative EXAMINATION: CT CHEST W CONT 4/16/2021 6:48 PM    INDICATION: Generalized weakness and hypoxia    COMPARISON: Chest radiograph earlier same day and July 22, 2016    TECHNIQUE: Multiple contiguous axial CT images of the chest were obtained from  the lung apices to the lung bases after the intravenous administration of 80 mL  Isovue-370 contrast material .     Radiation dose reduction techniques were used for this study:  Our CT scanners  use one or all of the following: Automated exposure control, adjustment of the  mA and/or kVp according to patient's size, iterative reconstruction. FINDINGS:  Lower Neck: No abnormality    Chest soft tissues: Left chest wall cardiac pacing device    Heart/Mediastinum: Patent proximal pulmonary arteries. The right hilar and  mediastinal adenopathy. Prior CABG. Left chest wall cardiac pacer device. Lungs: There is a large area of consolidation in the right upper lobe with a  moderate size area of cavitation and fluid level. Approximately 5.3 cm. The  consolidation is heterogenous. Bibasilar atelectasis noted. Pleura: No significant pleural fluid. No pneumothorax. Calcified pleural plaque  along the left hemidiaphragm. Visualized upper abdomen: No abnormality. Osseous structures: No suspicious osseous lesion. Impression 1. Large area of consolidation involving the posterior right upper lobe with a 5  cm area of superimposed cavitation. There is a may represent pneumonia with  necrotic cavitation as well as primary malignancy with cavitation. 2. Right hilar and mediastinal adenopathy, indeterminate for reactive or  malignant.   3. Calcified pleural plaques on the left Results from [facility] encounter on 21   CT HEAD WO CONT    Narrative Head CT    INDICATION: Chronic ataxia    Multiple axial images obtained through the brain without intravenous contrast.   Radiation dose reduction techniques were used for this study: All CT scans  performed at this facility use one or all of the following: Automated exposure  control, adjustment of the mA and/or kVp according to patient's size, iterative  reconstruction. FINDINGS: No areas of abnormal attenuation are seen in the brain. There is no CT  evidence of acute hemorrhage or infarction. The ventricles are normal in size. There are no extra-axial fluid collections. No masses are seen. There is  complete opacification of the right maxillary sinus and near complete  opacification of left maxillary sinus. Air-fluid level is present in the left  maxillary sinus. Ethmoid air cells and frontal sinuses are also completely  opacified. Left sphenoid sinus is opacified. There are no bony lesions. Impression 1. No CT evidence of acute intracranial abnormality. 2.  Extensive bilateral paranasal sinus disease. Results from [place] encounter on 10/23/12   CT CHEST WO CONT    Narrative [facility] [address]                                                            COMPUTED TOMOGRAPHY                 NAME: Vallie Brittle                                                        PT : 1950               SEX: M         MR#: [id number]     LOCATION/NS: CT-                 AGE: 62Y      ACCT: [de-identified]     ORDERING: IRENE POTTER                  PT TYPE: O                         RADIOLOGIST: Lidia Zuniga MD ([id number])  **Final Report**       ICD Codes / Adm. Diagnosis:    /     Examination:  CT CHEST WO CONTRAST  - [id number] - Oct 23 2012  2:31PM    Reason:  dyspnea exclude ipf hrct    REPORT:  CT OF THE CHEST WITHOUT CONTRAST, October 23, 2012    HISTORY: Dyspnea. Please evaluate for IPF. TECHNIQUE: Routine noncontrast axial images were obtained through the chest   followed by thin section high resolution images during inspiration and   expiration with the patient in the supine and prone positions. COMPARISON: PA lateral chest x-ray October 12, 2012. FINDINGS: A cardiac pacemaker is present. Median sternotomy wires are   present. There is bilateral gynecomastia. A thin calcified pleural plaque   is present along the left diaphragmatic surface. There are a few calcified   nonenlarged hilar lymph nodes and calcified granulomata in the right upper   lobe. Centrilobular emphysematous changes are present in the upper lobes. There is no lobar consolidation or pleural effusions. Included images of   the upper abdomen demonstrate a normal-appearing right adrenal gland. Left   adrenal gland is incompletely imaged. There is no thoracic adenopathy. There are no aggressive osseous lesions. Thin section high-resolution images demonstrate no groundglass opacities or   other changes suggestive of advanced interstitial lung disease such as   significantly thickened interlobular septa, subpleural honeycombing or   traction bronchiectasis. There is no significant air trapping on the   expiration images. Minimal atelectasis or scarring is present in the   dependent lower lobes. IMPRESSION:    1. No convincing findings of IPF. 2.  Calcified pleural plaque along the left diaphragmatic surface. Signing/Reading Doctor: John Vasquez MD ([id number])    Approved: John Vasquez MD ([id number])  10/23/2012                                      MRI Results (maximum last 3): No results found for this or any previous visit. Nuclear Medicine Results (maximum last 3): No results found for this or any previous visit. US Results (maximum last 3):   No results found for this or any previous visit. DEXA Results (maximum last 3): No results found for this or any previous visit. JOSE ANTONIO Results (maximum last 3): No results found for this or any previous visit. IR Results (maximum last 3): No results found for this or any previous visit. VAS/US Results (maximum last 3): No results found for this or any previous visit. PET Results (maximum last 3): No results found for this or any previous visit.        EKG Results     Procedure 720 Value Units Date/Time    EKG [[id number]] Collected: 04/16/21 1558    Order Status: Completed Updated: 04/16/21 1744     Ventricular Rate 89 BPM      Atrial Rate 89 BPM      P-R Interval 168 ms      QRS Duration 122 ms      Q-T Interval 434 ms      QTC Calculation (Bezet) 528 ms      Calculated P Axis 68 degrees      Calculated R Axis -63 degrees      Calculated T Axis 88 degrees      Diagnosis --     Normal sinus rhythm  Left anterior fascicular block  Cannot rule out Anterior infarct , age undetermined  Abnormal ECG  When compared with ECG of 22-JUL-2016 13:49,  Premature ventricular complexes are no longer Present  Left anterior fascicular block is now Present  Minimal criteria for Anterior infarct are now Present  QT has lengthened            Active Problems:  Patient Active Problem List    Diagnosis Date Noted    Cavitary lung disease 04/16/2021    GERD (gastroesophageal reflux disease)     Personal history of colonic polyps 01/08/2020    Slow transit constipation 01/08/2020    Frequency of urination 03/26/2018    Nocturia associated with benign prostatic hyperplasia 09/25/2017    Ischemic cardiomyopathy 06/30/2017    Pure hypercholesterolemia 06/23/2016    Coronary atherosclerosis of native coronary vessel 12/28/2015    S/P CABG (coronary artery bypass graft)     Essential hypertension 11/18/2015    COPD (chronic obstructive pulmonary disease) ([facility] [address] 03/09/2015    Paroxysmal ventricular tachycardia ([facility] [address] 04/24/2013    Automatic implantable cardioverter-defibrillator in situ 15/86/1272    Systolic CHF, chronic ([facility] 11/15/2011         DO [facility]  4/16/2021 10:16 PM

## 2021-04-17 NOTE — PROGRESS NOTES
Pharmacokinetic Consult to Pharmacist    James Gongora is a 72 y.o. male being treated  with vancomycin. Height: 5' 9\" (175.3 cm)  Weight: 87.1 kg (192 lb)  Lab Results   Component Value Date/Time    BUN 33 (H) 04/16/2021 03:53 PM    Creatinine 1.08 04/16/2021 03:53 PM    WBC 30.8 (H) 04/16/2021 03:53 PM    Procalcitonin 0.58 04/16/2021 03:53 PM    Lactic acid 1.3 04/16/2021 06:20 PM    Lactic acid 1.7 11/27/2015 11:48 AM      Estimated Creatinine Clearance: 70.6 mL/min (based on SCr of 1.08 mg/dL). Day 1 of vancomycin. Goal trough is 15-20. Vancomycin dose initiated at 2000 mg x 1 dose; followed by Vanc 1250 mg IV q12h. Will continue to follow patient and order levels when clinically indicated. Thank you,  Mine Samuels, PharmD.

## 2021-04-17 NOTE — CONSULTS
PULMONARY/CCM CONSULT :  4/17/2021    Date of Admission:  4/16/2021    Today's Date: 04/17/21  Time: 11:02 AM      The patient's chart has been reviewed and the chart has been discussed with nursing staff. Subjective: This patient has been seen and evaluated at the request of Dr. Sincere Ferrara. Patient is a 72 y.o.  male known to our office, last seen in 2017 by Dr. Adrian Castellon. He has a PMHx Gold stage II COPD, chronic systolic heart failure, s/p AICD, HTN, former smoker (quit 2001), balance issues, CAD, s/p CABG, and hyperlipidmia. Received COVID vaccine approx 1 week ago. He presented to the ED on 4/16 with c/o SOB and cough. Chest CT schan showed RUL with a 5 cm area of superimposed cavitation. This could represent PNA with necrotic cavitation, as well as, primary malignancy with cavitation. Right hilar and mediastinal adenopathy indeterminate for reactive or malignant. WBC 30.8, Procal 0.58, and LA 1.3 on admission. He was started on zosyn and vanc for treatment sepsis sucspected secondary to PNA and placed on Airborne precautions. Quantiferon and aspergillus pending. Blood cultures NGTD. Pulmonary was consulted for cavitary lung lesion in RUL. Upon entering the room, pt is sitting up in recliner watching TV. Gathering history was difficult, as patient was short with his answers. He states that he has not been back to our office since 2017 and that his PCP has cared for his COPD. Reports a productive cough, no worse than normal.  Denies any fevers. Denies any choking with foods. Expresses that SOB has been worse, especially with activity. He is not on home O2. Reports recent weight loss over past week, but he is unsure of \"how much\". He feels this is related to his lack of appetite. States he was around his sick mother recently who had \"breathing problems\".  When questioned about her breathing issues he states it was pneumonia, but when questioned if mothers illness was COVID-19 he states \"breathing problems and I can't ask her now she's dead\". Unsure of date mother passed as pt did not answer question. And went back to watching TV. Past Surgical History:   Procedure Laterality Date    HX CATARACT REMOVAL      - RIGHT EYE AND 3-2017 LEFT EYE    HX HEENT      tooth extraction    HX ORTHOPAEDIC Right     foot callus    HX PACEMAKER      ICD    [place] CARDIAC SURG PROCEDURE UNLIST      cabg x3     VASCULAR SURGERY PROCEDURE UNLIST        Social History     Tobacco Use    Smoking status: Former Smoker     Packs/day: 1.50     Years: 30.00     Pack years: 45.00     Types: Cigarettes     Quit date: 4/15/2001     Years since quittin.0    Smokeless tobacco: Never Used   Substance Use Topics    Alcohol use: Yes     Frequency: 2-4 times a month     Drinks per session: 3 or 4     Binge frequency: Never     Comment: social, 3 or less per week      Family History   Problem Relation Age of Onset    Hypertension Father     Heart Disease Brother     Hypertension Brother     No Known Problems Mother     Coronary Artery Disease Other         family hx    No Known Problems Maternal Grandmother     No Known Problems Maternal Grandfather     No Known Problems Paternal Grandmother     No Known Problems Paternal Grandfather       Allergies   Allergen Reactions    Ventolin [Albuterol Sulfate] Other (comments)     I used it once and woke up in the kitchen floor after my defibrillator went off.  Amiodarone Other (comments)     STUMBLES     Inspra [Eplerenone] Hives    Niaspan [Niacin] Hives    Nitroglycerin Other (comments)     SEVERE HYPOTENSION      Other Medication Anaphylaxis     RASPBERRIES       Prior to Admission Medications   Prescriptions Last Dose Informant Patient Reported? Taking? B.infantis-B.ani-B.long-B.bifi (Probiotic 4X) 10-15 mg TbEC   Yes No   Sig: Take  by mouth. aspirin delayed-release 81 mg tablet   Yes No   Sig: Take 81 mg by mouth daily.    bisacodyL (Dulcolax, bisacodyl,) 5 mg EC tablet   Yes No   Sig: Take 5 mg by mouth daily as needed for Constipation. carvediloL (COREG) 12.5 mg tablet   No No   Sig: Take 1 Tab by mouth two (2) times a day. cholecalciferol (Vitamin D3) 25 mcg (1,000 unit) cap   Yes No   Sig: Take  by mouth daily. ezetimibe (ZETIA) 10 mg tablet   No No   Sig: TAKE 1 TABLET DAILY   fluticasone propion-salmeteroL (Advair Diskus) 250-50 mcg/dose diskus inhaler   No No   Sig: USE 1 INHALATION TWICE A DAY IN THE MORNING AND EVENING APPROXIMATELY 12 HOURS APART   fluticasone propionate (FLONASE) 50 mcg/actuation nasal spray   No No   Si Sprays by Both Nostrils route daily. hydroCHLOROthiazide (HYDRODIURIL) 25 mg tablet   No No   Sig: Take 1 Tab by mouth daily. lisinopriL (PRINIVIL, ZESTRIL) 5 mg tablet   No No   Sig: Take 1 Tab by mouth daily. mexiletine (MEXITIL) 200 mg capsule   No No   Sig: Take 1 Cap by mouth two (2) times a day. multivitamin (ONE A DAY) tablet   Yes No   Sig: Take 1 Tab by mouth daily. nitroglycerin (NITROSTAT) 0.4 mg SL tablet   Yes No   Sig: by SubLINGual route every five (5) minutes as needed. pantoprazole (PROTONIX) 40 mg tablet   No No   Sig: TAKE 1 TABLET DAILY   ranolazine ER (Ranexa) 500 mg SR tablet   No No   Sig: Take 1 Tab by mouth two (2) times a day. simvastatin (ZOCOR) 40 mg tablet   No No   Sig: TAKE 1 TABLET NIGHTLY   sotaloL (BETAPACE) 120 mg tablet   No No   Sig: Take 1 Tab by mouth two (2) times a day.    tiotropium (Spiriva with HandiHaler) 18 mcg inhalation capsule   No No   Sig: INHALE THE CONTENTS OF 1 CAPSULE DAILY      Facility-Administered Medications: None       MEDS SCHEDULED:    Current Facility-Administered Medications   Medication Dose Route Frequency    nitroglycerin (NITROSTAT) tablet 0.4 mg  0.4 mg SubLINGual PRN    aspirin delayed-release tablet 81 mg  81 mg Oral DAILY    bisacodyL (DULCOLAX) tablet 5 mg  5 mg Oral DAILY PRN    cholecalciferol (VITAMIN D3) (1000 Units /25 mcg) tablet 2,000 Units  2,000 Units Oral DAILY    mexiletine (MEXITIL) capsule 200 mg (Patient Supplied)  200 mg Oral BID    fluticasone propionate (FLONASE) 50 mcg/actuation nasal spray 2 Spray  2 Spray Both Nostrils DAILY    carvediloL (COREG) tablet 12.5 mg  12.5 mg Oral BID    ezetimibe (ZETIA) tablet 10 mg  10 mg Oral DAILY    hydroCHLOROthiazide (HYDRODIURIL) tablet 25 mg  25 mg Oral DAILY    lisinopriL (PRINIVIL, ZESTRIL) tablet 5 mg  5 mg Oral DAILY    pantoprazole (PROTONIX) tablet 40 mg  40 mg Oral ACB    atorvastatin (LIPITOR) tablet 20 mg  20 mg Oral DAILY    sotaloL (BETAPACE) tablet 120 mg  120 mg Oral BID    ranolazine ER (RANEXA) tablet 500 mg  500 mg Oral BID    budesonide-formoteroL (SYMBICORT) 160-4.5 mcg/actuation HFA inhaler 2 Puff  2 Puff Inhalation BID RT    sodium chloride (NS) flush 5-40 mL  5-40 mL IntraVENous Q8H    sodium chloride (NS) flush 5-40 mL  5-40 mL IntraVENous PRN    acetaminophen (TYLENOL) tablet 650 mg  650 mg Oral Q6H PRN    Or    acetaminophen (TYLENOL) suppository 650 mg  650 mg Rectal Q6H PRN    polyethylene glycol (MIRALAX) packet 17 g  17 g Oral DAILY    senna-docusate (PERICOLACE) 8.6-50 mg per tablet 1 Tab  1 Tab Oral BID    magnesium hydroxide (MILK OF MAGNESIA) 400 mg/5 mL oral suspension 30 mL  30 mL Oral DAILY PRN    bisacodyL (DULCOLAX) suppository 10 mg  10 mg Rectal DAILY PRN    enoxaparin (LOVENOX) injection 40 mg  40 mg SubCUTAneous DAILY    tiotropium bromide (SPIRIVA RESPIMAT) 2.5 mcg /actuation  2 Puff Inhalation DAILY    piperacillin-tazobactam (ZOSYN) 3.375 g in 0.9% sodium chloride (MBP/ADV) 100 mL MBP  3.375 g IntraVENous Q8H    vancomycin (VANCOCIN) 1250 mg in  ml infusion  1,250 mg IntraVENous Q12H         Review of Systems  Pertinent items are noted in HPI.     Objective:     Vitals:    04/17/21 0153 04/17/21 0445 04/17/21 0746 04/17/21 1114   BP: 132/77  122/65 124/72   Pulse: 96  95 86   Resp: 18  20 20   Temp: 97.8 °F (36.6 °C)  98 °F (36.7 °C) 98 °F (36.7 °C)   SpO2: 91%  91% 92%   Weight: 179 lb 4.8 oz (81.3 kg) 178 lb 1.6 oz (80.8 kg)     Height:         04/17 0701 - 04/17 1900  In: 240 [P.O.:240]  Out: -   04/15 1901 - 04/17 0700  In: 750 [I.V.:750]  Out: 175 [Urine:175]      PHYSICAL EXAM     Physical Exam:   General:  Alert, cooperative, no acute distress, appears stated age. Eyes:  Conjunctivae/corneas clear. Nose: Nares patent and moist.    Mouth/Throat: Lips, mucosa, and tongue pink and intact. Neck: Supple, symmetrical.   Respiratory:   Diminished to RUL, clear otherwise to auscultation   Cardiovascular:  Regular rate and rhythm, S1, S2, no murmur, click, rub or gallop. GI:   Abdomen soft, non-tender. Bowel sounds active X 4. Musculoskeletal: Extremities symmetrical, atraumatic, no cyanosis, no edema. Pulses: 2+ and symmetric all extremities. Skin: Skin color, texture, turgor normal. No rashes or lesions       Neurologic: 2+ strength bilateral upper and lower extremities, sensation throughout appropriate. Alert and oriented. CHEST X-RAYS:  04/16/2021 07/22/2016      CT:   04/16/2021          CULTURES:  Results     Procedure Component Value Units Date/Time    CULTURE, BLOOD [[id number]] Collected: 04/17/21 0832    Order Status: Completed Specimen: Blood Updated: 04/17/21 0926    QUANTIFERON-TB GOLD PLUS [[id number]]     Order Status: Sent Specimen: Blood     CULTURE, BLOOD [[id number]] Collected: 04/16/21 1820    Order Status: Completed Specimen: Blood Updated: 04/17/21 0752     Special Requests: --        NO SPECIAL REQUESTS  RIGHT  ARM       Culture result: NO GROWTH AFTER 13 HOURS              ECHO:   07/2012  SUMMARY:       -  Left ventricle: Systolic function was mildly to moderately reduced. Ejection   fraction was estimated in the range of 40 % to 45 %. There was severe   hypokinesis of the basal-mid anterior, mid anteroseptal, mid inferoseptal,   and   basal-mid inferior wall(s). There was mild asymmetric hypertrophy of the   posterior wall. Doppler parameters were consistent with mild diastolic    dysfunction (grade 1).     -  Right ventricle: There was mild pulmonary artery hypertension.       -  Mitral valve: There was mild regurgitation. PFTs:   05/2017    LABS    Recent Labs     04/17/21  0833 04/16/21  1553   WBC 26.9* 30.8*   HGB 9.6* 10.5*   HCT 28.2* 30.9*   * 758*     Recent Labs     04/17/21  0833 04/16/21  1553   * 132*   K 3.5 3.9    96*   GLU 97 107*   CO2 25 25   BUN 26* 33*   CREA 0.91 1.08   MG 2.1 2.2     No results for input(s): PH, PCO2, PO2, HCO3 in the last 72 hours. Assessment:     Hospital Problems  Date Reviewed: 8/27/2020          Codes Class Noted POA    * (Principal) Cavitary lung disease ICD-10-CM: J98.4  ICD-9-CM: 518.89  4/16/2021 Unknown        GERD (gastroesophageal reflux disease) ICD-10-CM: K21.9  ICD-9-CM: 530.81  Unknown Yes        Ischemic cardiomyopathy ICD-10-CM: I25.5  ICD-9-CM: 414.8  6/30/2017 Yes    Overview Signed 3/19/2020 10:22 AM by Pardeep Padron MD     Last Assessment & Plan:   He has no symptoms of volume overload. We will continue his current regimen. Follow-up in 6 months with an echocardiogram prior to his return. Systolic CHF, chronic (HCC) ICD-10-CM: I50.22  ICD-9-CM: 428.22, 428.0  11/15/2011 Yes              Plan:   --requested CT to adjust recent CT scan to high resolution  --follow labs and cultures  --cont vanc and zosyn for now  --may need bronch with biopsy at some point, will defer to MD  --cont symbicort and spiriva   --quantiferon and aspergillus pending>>cont airborne precautions for now    Johnny Dustin,  NP-C    More than 50% of time documented was spent in face-to-face contact with the patient and in the care of the patient on the floor/unit where the patient is located.      The patient has been seen and examined by me personally, the chart,labs, and radiographic studies have been reviewed. Chest: CTA  Extremities: trace edema    I agree with the above assessment and plan. Will need to exclude TB, quantiferon P, place PPD, Sputum for AFB x 3, once excluded will schedule OP Navigation bronchoscopy with Biopsy and staging EBUS.     Matias Boogie MD.

## 2021-04-17 NOTE — PROGRESS NOTES
Bedside report received from night nurse Betsy Saha. Assessment done as noted  Respiration even and unlabored 20/min; denies pain or nausea at present. Remains on RA with O2 sats 93% at rest. Droplet isolation for TB r/o in place. Ordered PPE in place and in use. Encouraged to call with needs.

## 2021-04-17 NOTE — PROGRESS NOTES
ACUTE PHYSICAL THERAPY GOALS:  (Developed with and agreed upon by patient and/or caregiver.)  Discharge Goals:  (1.)Mr. Norma Ward will move from supine to sit and sit to supine  with INDEPENDENT within 7 treatment day(s). (2.)Mr. Norma Ward will transfer from bed to chair and chair to bed with INDEPENDENT using the least restrictive device within 7 treatment day(s). (3.)Mr. Norma Ward will ambulate with INDEPENDENT for 500+ feet with the least restrictive device within 7 treatment day(s). ________________________________________________________________________________________________      PHYSICAL THERAPY ASSESSMENT: Initial Assessment PT Treatment Day # 1      Hailey Sam is a 72 y.o. male   PRIMARY DIAGNOSIS: Cavitary lung disease  Cavitary lung disease [J98.4]       Reason for Referral:  Generalized weakness, pneumonia  ICD-10: Treatment Diagnosis: Generalized Muscle Weakness (M62.81)  Difficulty in walking, Not elsewhere classified (R26.2)  History of falling (Z91.81)  [place] / [place] / Product Type: Medicare /     ASSESSMENT:     REHAB RECOMMENDATIONS:   Recommendation to date pending progress:  [address] [place] [address] versus no needs pending progress  Equipment:    To Be Determined     PRIOR LEVEL OF FUNCTION:  (Prior to Hospitalization) INITIAL/CURRENT LEVEL OF FUNCTION:  (Most Recently Demonstrated)   Bed Mobility:   Independent  Sit to Stand:   Independent  Transfers:   Independent  Gait/Mobility:   Independent Bed Mobility:   Supervision  Sit to Stand:   Minimal Assistance  Transfers:   Not tested  Gait/Mobility:  [employer] Assistance     ASSESSMENT:  Mr. Norma aWrd is currently functioning below baseline. Pt requiring use of RW and gait with decreased tolerance for activity noted. Pt typically independent with all mobility at baseline. Pt with history of 2 recent falls just prior to admission.  Pt does report chronic L knee pain and instability. Would benefit from skilled PT interventions while in acute setting to maximize function and independence. May benefit from HHPT at discharge pending progress. SUBJECTIVE:   Mr. Tom Momin states, \"I'm doing so so. \"    SOCIAL HISTORY/LIVING ENVIRONMENT:   Lives alone in   Independent with all mobility  Chronic L knee pain w/ instability giving way at times  2 falls just PTA     OBJECTIVE:     PAIN: VITAL SIGNS: LINES/DRAINS:   Pre Treatment: Pain Screen  Pain Scale 1: Numeric (0 - 10)  Pain Intensity 1: 0  Post Treatment: 0/10   none  O2 Device: None (Room air)     GROSS EVALUATION:   Within Functional Limits Abnormal/ Functional Abnormal/ Non-Functional (see comments) Not Tested Comments:   AROM [x] [] [] []    PROM [] [] [] [x]    Strength [] [x] [] []    Balance [] [x] [] []    Posture [] [] [] [x]    Sensation [] [] [] [x]    Coordination [] [x] [] []    Tone [] [] [] [x]    Edema [] [] [] [x]    Activity Tolerance [] [x] [] []     [] [] [] []      COGNITION/  PERCEPTION: Intact Impaired   (see comments) Comments:   Orientation [x] []    Vision [x] []    Hearing [x] []    Command Following [x] []    Safety Awareness [x] []     [] []      MOBILITY: I Mod I S SBA CGA Min Mod Max Total  NT x2 Comments:   Bed Mobility    Rolling [x] [] [] [] [] [] [] [] [] [] []    Supine to Sit [] [] [x] [x] [] [] [] [] [] [] []    Scooting [] [] [x] [] [] [] [] [] [] [] []    Sit to Supine [] [] [] [] [] [] [] [] [] [x] []    Transfers    Sit to Stand [] [] [] [] [] [x] [] [] [] [] []    Bed to Chair [] [] [] [x] [x] [] [] [] [] [] []    Stand to Sit [] [] [] [x] [x] [] [] [] [] [] []    I=Independent, Mod I=Modified Independent, S=Supervision, SBA=Standby Assistance, CGA=Contact Guard Assistance,   Min=Minimal Assistance, Mod=Moderate Assistance, Max=Maximal Assistance, Total=Total Assistance, NT=Not Tested  GAIT: I Mod I S SBA CGA Min Mod Max Total  NT x2 Comments:   Level of Assistance [] [] [] [x] [x] [] [] [] [] [] []    Distance 5 ft    DME Rolling Walker    Gait Quality Short shuffled steps    Weightbearing Status N/A     I=Independent, Mod I=Modified Independent, S=Supervision, SBA=Standby Assistance, CGA=Contact Guard Assistance,   Min=Minimal Assistance, Mod=Moderate Assistance, Max=Maximal Assistance, Total=Total Assistance, NT=Not Tested    [facility] [employer] Form       How much difficulty does the patient currently have. .. Unable A Lot A Little None   1. Turning over in bed (including adjusting bedclothes, sheets and blankets)? [] 1   [] 2   [] 3   [x] 4   2. Sitting down on and standing up from a chair with arms ( e.g., wheelchair, bedside commode, etc.)   [] 1   [] 2   [x] 3   [] 4   3. Moving from lying on back to sitting on the side of the bed? [] 1   [] 2   [] 3   [x] 4   How much help from another person does the patient currently need. .. Total A Lot A Little None   4. Moving to and from a bed to a chair (including a wheelchair)? [] 1   [] 2   [x] 3   [] 4   5. Need to walk in hospital room? [] 1   [] 2   [x] 3   [] 4   6. Climbing 3-5 steps with a railing? [] 1   [x] 2   [] 3   [] 4   © 2007, Trustees of [facility], under license to [employer]. All rights reserved     Score:  Initial: 19 Most Recent: X (Date: -- )    Interpretation of Tool:  Represents activities that are increasingly more difficult (i.e. Bed mobility, Transfers, Gait). PLAN:   FREQUENCY/DURATION: PT Plan of Care: 3 times/week for duration of hospital stay or until stated goals are met, whichever comes first.    PROBLEM LIST:   (Skilled intervention is medically necessary to address:)  1. Decreased ADL/Functional Activities  2. Decreased Activity Tolerance  3. Decreased Balance  4. Decreased Gait Ability  5. Decreased Strength  6.  Decreased Transfer Abilities   INTERVENTIONS PLANNED:   (Benefits and precautions of physical therapy have been discussed with the patient.)  1. Therapeutic Activity  2. Therapeutic Exercise/HEP  3. Neuromuscular Re-education  4. Gait Training  5. Manual Therapy  6. Education     TREATMENT:     EVALUATION: Low Complexity : (Untimed Charge)    TREATMENT:   ($$ Therapeutic Activity: 8-22 mins    )  Therapeutic Activity (8  Minutes): Therapeutic activity included Ambulation on level ground and Standing balance to improve functional Mobility, Strength and Activity tolerance.     TREATMENT GRID:  N/A    AFTER TREATMENT POSITION/PRECAUTIONS:  Chair, Needs within reach and PCT at bedside    INTERDISCIPLINARY COLLABORATION:  RN/PCT    TOTAL TREATMENT DURATION:  PT Patient Time In/Time Out  Time In: 9558  Time Out: Umang Oliver, PT, DPT

## 2021-04-17 NOTE — ACP (ADVANCE CARE PLANNING)
Advance care planninginitial encounter      Face to face time - 18 minutes face-to-face time spent discussion the care       Pt's decision making capacity   [x] Yes  [ ] NO    Names of POA/surrogate decision maker when patient is altered  : Erika Hunt / Petra Herndon    Other members present in the meeting :      Patient / surrogate decision-maker ultimately chose. .. [] Full code- full aggressive medical and surgical interventions, including intubations, resuscitations, pressors, artificial tube feeding  [x ] DO NOT RESUSCITATE -okay to intubate,  and other aggressive medical and surgical intervention   [ ] Not resuscitate, DO NOT INTUBATE, but okay for other aggressive medical and surgical intervention   [ ] DNR/DNI, hospice, comfort focus care to maximize patient's quality of life  [ ] DNR/DNI, strict comfort care ONLY        Further discussion regarding principle disease process.  prognosis, plans of care, and treatment goals  : Goals of care comfort, patient is DO NOT RESUSCITATE but okay to intubate, stated that his brother can be surrogate decision maker if he is incapacitated, also care therapeutic options and diagnosis discussed, patient expressed understanding

## 2021-04-17 NOTE — PROGRESS NOTES
[facility] Hospitalist Note     Admit Date:  2021  5:37 PM   Name:  Aayush Mcarthur   Age:  72 y.o.  :  1950   MRN:  [id number]   PCP:  Jacqueline García MD  Treatment Team: Attending Provider: Zahra Phan MD; Consulting Provider: Zulma Murray MD; Staff Nurse: Madelin Dubon, RN; Primary Nurse: Yessy Rogers RN    HPI/Subjective:     Patient is a 72 y.o. male with medical h/o history of chronic systolic heart failure, status post AICD, COPD, hypertensions, CAD, status post CABG, hyperlipidemia presented to the ED with worsening shortness of breath and cough. Chest CT scan showed right upper lobe consolidation with a 5 cm area of superimposed cavitation. Patient has received Covid vaccine approximately 1 week ago. He was admitted with severe sepsis secondary to suspected bacterial pneumonia with elevated procalcitonin. He was started on vancomycin and Zosyn. Tuberculosis and primary malignancy with cavitation in differential.  Pulmonology consulted. : Patient seen at the bedside. Reports he is feeling better. He works is still upset because still get medicine on time. Complaining of worsening shortness of breath with activity. He is not on home oxygen. He is a former smoker quit long time ago. Assessment and Plan:     Severe sepsis secondary to suspected bacterial pneumonia:  CT scan with right upper lobe consolidation with a 5 cm area of superimposed cavitation.   Tuberculosis and primary malignancy with cavitation and differential  Follow-up QuantiFERON, Aspergillus Ag, PPD, sputum for AFB x 3  Pulmonology consulted and recommend once excluded tuberculosis we will schedule OP navigational bronchoscopy with biopsy and staging EBUS  Continue vancomycin and Zosyn  Follow-up on cultures  Continue airborne precautions for now    CAD/hypertension/hyperlipidemia/chronic systolic heart failure/post AICD:  Stable  Continue home meds aspirin, atorvastatin, carvedilol, ezetimibe, hydrochlorothiazide, lisinopril    COPD:  Continue nebs treatment  Continue Symbicort and Spiriva    Discharge planning: To be determined    DVT ppx ordered  Code status:  Full  Estimated LOS:  Greater than 2 midnights  Risk:  high    Hospital Problems as of 4/17/2021 Date Reviewed: 8/27/2020          Codes Class Noted - Resolved POA    * (Principal) Cavitary lung disease ICD-10-CM: J98.4  ICD-9-CM: 518.89  4/16/2021 - Present Unknown        GERD (gastroesophageal reflux disease) ICD-10-CM: K21.9  ICD-9-CM: 530.81  Unknown - Present Yes        Ischemic cardiomyopathy ICD-10-CM: I25.5  ICD-9-CM: 414.8  6/30/2017 - Present Yes    Overview Signed 3/19/2020 10:22 AM by Lydia Haji MD     Last Assessment & Plan:   He has no symptoms of volume overload. We will continue his current regimen. Follow-up in 6 months with an echocardiogram prior to his return. Systolic CHF, chronic (HCC) ICD-10-CM: I50.22  ICD-9-CM: 428.22, 428.0  11/15/2011 - Present Yes                10 systems reviewed and negative except as noted in HPI.   Past Medical History:   Diagnosis Date    CAD (coronary artery disease)     CABG 2001, MI 5-2001    Callus of foot     history of removal    Chronic obstructive pulmonary disease ([facility] [address]     Coronary atherosclerosis of native coronary vessel 12/28/2015    GERD (gastroesophageal reflux disease)     History of dental examination 01/01/2017    History of heart failure     History of palpitations     Hypertension     OA (osteoarthritis)     Pacemaker     S/P CABG (coronary artery bypass graft)     S/P coronary artery bypass graft x 3     Ventricular arrhythmia       Past Surgical History:   Procedure Laterality Date    HX CATARACT REMOVAL      2-2017 RIGHT EYE AND 3-2017 LEFT EYE    HX HEENT      tooth extraction    HX ORTHOPAEDIC Right     foot callus    HX PACEMAKER      ICD    DC CARDIAC SURG PROCEDURE UNLIST      cabg x3     VASCULAR SURGERY PROCEDURE UNLIST        Allergies   Allergen Reactions    Ventolin [Albuterol Sulfate] Other (comments)     I used it once and woke up in the kitchen floor after my defibrillator went off.  Amiodarone Other (comments)     STUMBLES     Inspra [Eplerenone] Hives    Niaspan [Niacin] Hives    Nitroglycerin Other (comments)     SEVERE HYPOTENSION      Other Medication Anaphylaxis     RASPBERRIES       Social History     Tobacco Use    Smoking status: Former Smoker     Packs/day: 1.50     Years: 30.00     Pack years: 45.00     Types: Cigarettes     Quit date: 4/15/2001     Years since quittin.0    Smokeless tobacco: Never Used   Substance Use Topics    Alcohol use: Yes     Frequency: 2-4 times a month     Drinks per session: 3 or 4     Binge frequency: Never     Comment: social, 3 or less per week      Family History   Problem Relation Age of Onset    Hypertension Father     Heart Disease Brother     Hypertension Brother     No Known Problems Mother     Coronary Artery Disease Other         family hx    No Known Problems Maternal Grandmother     No Known Problems Maternal Grandfather     No Known Problems Paternal Grandmother     No Known Problems Paternal Grandfather       Family history reviewed and noncontributory. Immunization History   Administered Date(s) Administered    Influenza Vaccine 2014, 10/01/2015, 10/01/2016, 2018    Influenza Vaccine (Tri) Adjuvanted (>65 Yrs FLUAD TRI 91005) 2019    Influenza Vaccine Split 2012    Pneumococcal Polysaccharide (PPSV-23) 2016    Pneumococcal Vaccine (Unspecified Type) 2014     PTA Medications:  Prior to Admission Medications   Prescriptions Last Dose Informant Patient Reported? Taking? B.infantis-B.ani-B.long-B.bifi (Probiotic 4X) 10-15 mg TbEC   Yes No   Sig: Take  by mouth. aspirin delayed-release 81 mg tablet   Yes No   Sig: Take 81 mg by mouth daily.    bisacodyL (Dulcolax, bisacodyl,) 5 mg EC tablet   Yes No Sig: Take 5 mg by mouth daily as needed for Constipation. carvediloL (COREG) 12.5 mg tablet   No No   Sig: Take 1 Tab by mouth two (2) times a day. cholecalciferol (Vitamin D3) 25 mcg (1,000 unit) cap   Yes No   Sig: Take  by mouth daily. ezetimibe (ZETIA) 10 mg tablet   No No   Sig: TAKE 1 TABLET DAILY   fluticasone propion-salmeteroL (Advair Diskus) 250-50 mcg/dose diskus inhaler   No No   Sig: USE 1 INHALATION TWICE A DAY IN THE MORNING AND EVENING APPROXIMATELY 12 HOURS APART   fluticasone propionate (FLONASE) 50 mcg/actuation nasal spray   No No   Si Sprays by Both Nostrils route daily. hydroCHLOROthiazide (HYDRODIURIL) 25 mg tablet   No No   Sig: Take 1 Tab by mouth daily. lisinopriL (PRINIVIL, ZESTRIL) 5 mg tablet   No No   Sig: Take 1 Tab by mouth daily. mexiletine (MEXITIL) 200 mg capsule   No No   Sig: Take 1 Cap by mouth two (2) times a day. multivitamin (ONE A DAY) tablet   Yes No   Sig: Take 1 Tab by mouth daily. nitroglycerin (NITROSTAT) 0.4 mg SL tablet   Yes No   Sig: by SubLINGual route every five (5) minutes as needed. pantoprazole (PROTONIX) 40 mg tablet   No No   Sig: TAKE 1 TABLET DAILY   ranolazine ER (Ranexa) 500 mg SR tablet   No No   Sig: Take 1 Tab by mouth two (2) times a day. simvastatin (ZOCOR) 40 mg tablet   No No   Sig: TAKE 1 TABLET NIGHTLY   sotaloL (BETAPACE) 120 mg tablet   No No   Sig: Take 1 Tab by mouth two (2) times a day.    tiotropium (Spiriva with HandiHaler) 18 mcg inhalation capsule   No No   Sig: INHALE THE CONTENTS OF 1 CAPSULE DAILY      Facility-Administered Medications: None       Objective:     Patient Vitals for the past 24 hrs:   Temp Pulse Resp BP SpO2   21 1114 98 °F (36.7 °C) 86 20 124/72 92 %   21 0746 98 °F (36.7 °C) 95 20 122/65 91 %   21 0153 97.8 °F (36.6 °C) 96 18 132/77 91 %   21 0020  82  131/67 92 %   21 0000  92  128/86 92 %   21 2341  92  131/64 92 %   21 2320  96  132/69 90 %   04/16/21 2300  83  (!) 141/64 93 %   04/16/21 2241  95  123/76 (!) 88 %   04/16/21 2220  89  (!) 131/59 91 %   04/16/21 2140  90  127/63 92 %   04/16/21 2021     93 %   04/16/21 1952  89 18 (!) 109/59 91 %   04/16/21 1928    (!) 112/58 93 %   04/16/21 1922  83 18 (!) 112/58 93 %   04/16/21 1746    104/63 93 %   04/16/21 1549 98.6 °F (37 °C) 81 17 93/63 92 %     Oxygen Therapy  O2 Sat (%): 92 % (04/17/21 1114)  Pulse via Oximetry: 82 beats per minute (04/17/21 0020)  O2 Device: None (Room air) (04/17/21 1240)    Estimated body mass index is 26.3 kg/m² as calculated from the following:    Height as of this encounter: 5' 9\" (1.753 m). Weight as of this encounter: 80.8 kg (178 lb 1.6 oz). Intake/Output Summary (Last 24 hours) at 4/17/2021 1307  Last data filed at 4/17/2021 0818  Gross per 24 hour   Intake 990 ml   Output 175 ml   Net 815 ml       *Note that automatically entered I/Os may not be accurate; dependent on patient compliance with collection and accurate  by assistants. Physical Exam:  General:    Well nourished. Alert. Eyes:   Normal sclerae. Extraocular movements intact. HENT:  Normocephalic, atraumatic. Moist mucous membranes  CV:   RRR. No m/r/g. Lungs:  Diminished breath sounds to right upper lobe  Abdomen: Soft, nontender, nondistended. Extremities: Warm and dry. No cyanosis or edema. Neurologic: CN II-XII grossly intact. Sensation intact. Skin:     No rashes or jaundice. Normal coloration  Psych:  Normal mood and affect. I reviewed the labs, imaging, EKGs, telemetry, and other studies done this admission.   Data Reviewed:   Recent Results (from the past 24 hour(s))   CBC WITH AUTOMATED DIFF    Collection Time: 04/16/21  3:53 PM   Result Value Ref Range    WBC 30.8 (H) 4.3 - 11.1 K/uL    RBC 3.56 (L) 4.23 - 5.6 M/uL    HGB 10.5 (L) 13.6 - 17.2 g/dL    HCT 30.9 (L) 41.1 - 50.3 %    MCV 86.8 79.6 - 97.8 FL    MCH 29.5 26.1 - 32.9 PG    MCHC 34.0 31.4 - 35.0 g/dL    RDW 13.8 11.9 - 14.6 %    PLATELET 705 (H) 171 - 450 K/uL    MPV 8.5 (L) 9.4 - 12.3 FL    ABSOLUTE NRBC 0.00 0.0 - 0.2 K/uL    DF AUTOMATED      NEUTROPHILS 90 (H) 43 - 78 %    LYMPHOCYTES 4 (L) 13 - 44 %    MONOCYTES 5 4.0 - 12.0 %    EOSINOPHILS 0 (L) 0.5 - 7.8 %    BASOPHILS 0 0.0 - 2.0 %    IMMATURE GRANULOCYTES 1 0.0 - 5.0 %    ABS. NEUTROPHILS 27.8 (H) 1.7 - 8.2 K/UL    ABS. LYMPHOCYTES 1.1 0.5 - 4.6 K/UL    ABS. MONOCYTES 1.5 (H) 0.1 - 1.3 K/UL    ABS. EOSINOPHILS 0.0 0.0 - 0.8 K/UL    ABS. BASOPHILS 0.1 0.0 - 0.2 K/UL    ABS. IMM. GRANS. 0.3 0.0 - 0.5 K/UL   METABOLIC PANEL, COMPREHENSIVE    Collection Time: 04/16/21  3:53 PM   Result Value Ref Range    Sodium 132 (L) 136 - 145 mmol/L    Potassium 3.9 3.5 - 5.1 mmol/L    Chloride 96 (L) 98 - 107 mmol/L    CO2 25 21 - 32 mmol/L    Anion gap 11 7 - 16 mmol/L    Glucose 107 (H) 65 - 100 mg/dL    BUN 33 (H) 8 - 23 MG/DL    Creatinine 1.08 0.8 - 1.5 MG/DL    GFR est AA >60 >60 ml/min/1.73m2    GFR est non-AA >60 >60 ml/min/1.73m2    Calcium 8.8 8.3 - 10.4 MG/DL    Bilirubin, total 1.0 0.2 - 1.1 MG/DL    ALT (SGPT) 229 (H) 12 - 65 U/L    AST (SGOT) 232 (H) 15 - 37 U/L    Alk. phosphatase 203 (H) 50 - 136 U/L    Protein, total 8.2 6.3 - 8.2 g/dL    Albumin 1.7 (L) 3.2 - 4.6 g/dL    Globulin 6.5 (H) 2.3 - 3.5 g/dL    A-G Ratio 0.3 (L) 1.2 - 3.5     MAGNESIUM    Collection Time: 04/16/21  3:53 PM   Result Value Ref Range    Magnesium 2.2 1.8 - 2.4 mg/dL   PROCALCITONIN    Collection Time: 04/16/21  3:53 PM   Result Value Ref Range    Procalcitonin 0.58 ng/mL   EKG, 12 LEAD, INITIAL    Collection Time: 04/16/21  3:58 PM   Result Value Ref Range    Ventricular Rate 89 BPM    Atrial Rate 89 BPM    P-R Interval 168 ms    QRS Duration 122 ms    Q-T Interval 434 ms    QTC Calculation (Bezet) 528 ms    Calculated P Axis 68 degrees    Calculated R Axis -63 degrees    Calculated T Axis 88 degrees    Diagnosis       A sensing V pacing.    Abnormal ECG  When compared with ECG of 22-JUL-2016 13:49,  Premature ventricular complexes are no longer Present  Left anterior fascicular block is now Present  Minimal criteria for Anterior infarct are now Present  QT has lengthened  Confirmed by Poncho Jordan ([id number]) on 4/17/2021 12:20:16 PM     CULTURE, BLOOD    Collection Time: 04/16/21  6:20 PM    Specimen: Blood   Result Value Ref Range    Special Requests: NO SPECIAL REQUESTS  RIGHT  ARM        Culture result: NO GROWTH AFTER 13 HOURS     LACTIC ACID    Collection Time: 04/16/21  6:20 PM   Result Value Ref Range    Lactic acid 1.3 0.4 - 2.0 MMOL/L   METABOLIC PANEL, COMPREHENSIVE    Collection Time: 04/17/21  8:33 AM   Result Value Ref Range    Sodium 134 (L) 136 - 145 mmol/L    Potassium 3.5 3.5 - 5.1 mmol/L    Chloride 100 98 - 107 mmol/L    CO2 25 21 - 32 mmol/L    Anion gap 9 7 - 16 mmol/L    Glucose 97 65 - 100 mg/dL    BUN 26 (H) 8 - 23 MG/DL    Creatinine 0.91 0.8 - 1.5 MG/DL    GFR est AA >60 >60 ml/min/1.73m2    GFR est non-AA >60 >60 ml/min/1.73m2    Calcium 8.6 8.3 - 10.4 MG/DL    Bilirubin, total 1.1 0.2 - 1.1 MG/DL    ALT (SGPT) 223 (H) 12 - 65 U/L    AST (SGOT) 285 (H) 15 - 37 U/L    Alk.  phosphatase 199 (H) 50 - 136 U/L    Protein, total 7.4 6.3 - 8.2 g/dL    Albumin 1.5 (L) 3.2 - 4.6 g/dL    Globulin 5.9 (H) 2.3 - 3.5 g/dL    A-G Ratio 0.3 (L) 1.2 - 3.5     MAGNESIUM    Collection Time: 04/17/21  8:33 AM   Result Value Ref Range    Magnesium 2.1 1.8 - 2.4 mg/dL   CBC WITH AUTOMATED DIFF    Collection Time: 04/17/21  8:33 AM   Result Value Ref Range    WBC 26.9 (H) 4.3 - 11.1 K/uL    RBC 3.29 (L) 4.23 - 5.6 M/uL    HGB 9.6 (L) 13.6 - 17.2 g/dL    HCT 28.2 (L) 41.1 - 50.3 %    MCV 85.7 79.6 - 97.8 FL    MCH 29.2 26.1 - 32.9 PG    MCHC 34.0 31.4 - 35.0 g/dL    RDW 13.9 11.9 - 14.6 %    PLATELET 208 (H) 481 - 450 K/uL    MPV 8.7 (L) 9.4 - 12.3 FL    ABSOLUTE NRBC 0.00 0.0 - 0.2 K/uL    DF AUTOMATED      NEUTROPHILS 90 (H) 43 - 78 %    LYMPHOCYTES 4 (L) 13 - 44 %    MONOCYTES 5 4.0 - 12.0 %    EOSINOPHILS 0 (L) 0.5 - 7.8 %    BASOPHILS 0 0.0 - 2.0 %    IMMATURE GRANULOCYTES 1 0.0 - 5.0 %    ABS. NEUTROPHILS 24.1 (H) 1.7 - 8.2 K/UL    ABS. LYMPHOCYTES 1.1 0.5 - 4.6 K/UL    ABS. MONOCYTES 1.3 0.1 - 1.3 K/UL    ABS. EOSINOPHILS 0.1 0.0 - 0.8 K/UL    ABS. BASOPHILS 0.0 0.0 - 0.2 K/UL    ABS. IMM.  GRANS. 0.3 0.0 - 0.5 K/UL       All Micro Results     Procedure Component Value Units Date/Time    AFB CULTURE + SMEAR W/RFLX ID FROM CULTURE [[id number]]     Order Status: Sent     CULTURE, BLOOD [[id number]] Collected: 04/17/21 0832    Order Status: Completed Specimen: Blood Updated: 04/17/21 0926    CULTURE, BLOOD [[id number]] Collected: 04/16/21 1820    Order Status: Completed Specimen: Blood Updated: 04/17/21 0752     Special Requests: --        NO SPECIAL REQUESTS  RIGHT  ARM       Culture result: NO GROWTH AFTER 13 HOURS       QUANTIFERON-TB GOLD PLUS [[id number]]     Order Status: Sent Specimen: Blood           Current Facility-Administered Medications   Medication Dose Route Frequency    nitroglycerin (NITROSTAT) tablet 0.4 mg  0.4 mg SubLINGual PRN    aspirin delayed-release tablet 81 mg  81 mg Oral DAILY    bisacodyL (DULCOLAX) tablet 5 mg  5 mg Oral DAILY PRN    cholecalciferol (VITAMIN D3) (1000 Units /25 mcg) tablet 2,000 Units  2,000 Units Oral DAILY    mexiletine (MEXITIL) capsule 200 mg (Patient Supplied)  200 mg Oral BID    fluticasone propionate (FLONASE) 50 mcg/actuation nasal spray 2 Spray  2 Spray Both Nostrils DAILY    carvediloL (COREG) tablet 12.5 mg  12.5 mg Oral BID    ezetimibe (ZETIA) tablet 10 mg  10 mg Oral DAILY    hydroCHLOROthiazide (HYDRODIURIL) tablet 25 mg  25 mg Oral DAILY    lisinopriL (PRINIVIL, ZESTRIL) tablet 5 mg  5 mg Oral DAILY    pantoprazole (PROTONIX) tablet 40 mg  40 mg Oral ACB    atorvastatin (LIPITOR) tablet 20 mg  20 mg Oral DAILY    sotaloL (BETAPACE) tablet 120 mg  120 mg Oral BID    ranolazine ER (RANEXA) tablet 500 mg  500 mg Oral BID    budesonide-formoteroL (SYMBICORT) 160-4.5 mcg/actuation HFA inhaler 2 Puff  2 Puff Inhalation BID RT    sodium chloride (NS) flush 5-40 mL  5-40 mL IntraVENous Q8H    sodium chloride (NS) flush 5-40 mL  5-40 mL IntraVENous PRN    acetaminophen (TYLENOL) tablet 650 mg  650 mg Oral Q6H PRN    Or    acetaminophen (TYLENOL) suppository 650 mg  650 mg Rectal Q6H PRN    polyethylene glycol (MIRALAX) packet 17 g  17 g Oral DAILY    senna-docusate (PERICOLACE) 8.6-50 mg per tablet 1 Tab  1 Tab Oral BID    magnesium hydroxide (MILK OF MAGNESIA) 400 mg/5 mL oral suspension 30 mL  30 mL Oral DAILY PRN    bisacodyL (DULCOLAX) suppository 10 mg  10 mg Rectal DAILY PRN    enoxaparin (LOVENOX) injection 40 mg  40 mg SubCUTAneous DAILY    tiotropium bromide (SPIRIVA RESPIMAT) 2.5 mcg /actuation  2 Puff Inhalation DAILY    [START ON 4/18/2021] vancomycin trough reminder   Other ONCE    tuberculin injection 5 Units  5 Units IntraDERMal ONCE    piperacillin-tazobactam (ZOSYN) 3.375 g in 0.9% sodium chloride (MBP/ADV) 100 mL MBP  3.375 g IntraVENous Q8H    vancomycin (VANCOCIN) 1250 mg in  ml infusion  1,250 mg IntraVENous Q12H       Other Studies:  No results found for this visit on 04/16/21. Ct Chest W Cont    Result Date: 4/16/2021  EXAMINATION: CT CHEST W CONT 4/16/2021 6:48 PM INDICATION: Generalized weakness and hypoxia COMPARISON: Chest radiograph earlier same day and July 22, 2016 TECHNIQUE: Multiple contiguous axial CT images of the chest were obtained from the lung apices to the lung bases after the intravenous administration of 80 mL Isovue-370 contrast material . Radiation dose reduction techniques were used for this study:  Our CT scanners use one or all of the following: Automated exposure control, adjustment of the mA and/or kVp according to patient's size, iterative reconstruction.  FINDINGS: Lower Neck: No abnormality Chest soft tissues: Left chest wall cardiac pacing device Heart/Mediastinum: Patent proximal pulmonary arteries. The right hilar and mediastinal adenopathy. Prior CABG. Left chest wall cardiac pacer device. Lungs: There is a large area of consolidation in the right upper lobe with a moderate size area of cavitation and fluid level. Approximately 5.3 cm. The consolidation is heterogenous. Bibasilar atelectasis noted. Pleura: No significant pleural fluid. No pneumothorax. Calcified pleural plaque along the left hemidiaphragm. Visualized upper abdomen: No abnormality. Osseous structures: No suspicious osseous lesion. 1. Large area of consolidation involving the posterior right upper lobe with a 5 cm area of superimposed cavitation. There is a may represent pneumonia with necrotic cavitation as well as primary malignancy with cavitation. 2. Right hilar and mediastinal adenopathy, indeterminate for reactive or malignant. 3. Calcified pleural plaques on the left     Xr Chest Port    Result Date: 4/16/2021  Portable chest x-ray CLINICAL INDICATION: Weakness and cough FINDINGS: Single AP view the chest compared to a similar exam dated 7/20/2016 shows new dense airspace opacity throughout the right upper lobe extending to the right lung apex. The left lung is clear. Post CABG changes noted. AICD is in place. New dense airspace consolidation in the right upper lobe concerning for pneumonia. SARS-CoV-2 Lab Results  \"Novel Coronavirus\" Test: No results found for: COV2NT   \"Emergent Disease\" Test: No results found for: EDPR  \"SARS-COV-2\" Test: No results found for: XGCOVT  Rapid Test: No results found for: COVR            Part of this note was written by using a voice dictation software and the note has been proof read but may still contain some grammatical/other typographical errors.     Signed:  Emelia Lui MD

## 2021-04-17 NOTE — PROGRESS NOTES
No acute events overnight. Patient resting quietly in bed. Respirations present, even and unlabored on room air. Bed low and locked, safety measures in place. No signs of distress, no needs expressed. Call light within reach, encouraged patient to call with any needs. Preparing to give report to oncoming RN.

## 2021-04-17 NOTE — PROGRESS NOTES
Patient received to room 810 via stretcher. Respirations present, even and unlabored on room air. Lung sounds coarse. Patient AOx4. Skin assessment completed with Prieto Kapadia RN. Bed low and locked, safety measures in place. No signs of distress, no needs expressed. Call light within reach, encouraged patient to call with any needs. Will continue to monitor.

## 2021-04-17 NOTE — ED NOTES
TRANSFER - OUT REPORT:    Verbal report given to john RN(name) on Nessa Castano  being transferred to 8th floor(unit) for routine progression of care       Report consisted of patients Situation, Background, Assessment and   Recommendations(SBAR). Information from the following report(s) SBAR was reviewed with the receiving nurse. Lines:   Peripheral IV 04/16/21 Left Antecubital (Active)   Site Assessment Clean, dry, & intact 04/16/21 1553        Opportunity for questions and clarification was provided.       Patient transported with:  Patient transport

## 2021-04-17 NOTE — PROGRESS NOTES
TRANSFER - IN REPORT:    Verbal report received from [facility]) on Clearpatricia Morris  being received from ED(unit) for routine progression of care      Report consisted of patients Situation, Background, Assessment and   Recommendations(SBAR). Information from the following report(s) SBAR, Kardex, [facility] and Recent Results was reviewed with the receiving nurse. Opportunity for questions and clarification was provided. Assessment completed upon patients arrival to unit and care assumed.

## 2021-04-18 LAB
ALBUMIN SERPL-MCNC: 1.4 G/DL (ref 3.2–4.6)
ALBUMIN/GLOB SERPL: 0.2 {RATIO} (ref 1.2–3.5)
ALP SERPL-CCNC: 222 U/L (ref 50–136)
ALT SERPL-CCNC: 267 U/L (ref 12–65)
ANION GAP SERPL CALC-SCNC: 7 MMOL/L (ref 7–16)
AST SERPL-CCNC: 360 U/L (ref 15–37)
BASOPHILS # BLD: 0 K/UL (ref 0–0.2)
BASOPHILS NFR BLD: 0 % (ref 0–2)
BILIRUB SERPL-MCNC: 1.6 MG/DL (ref 0.2–1.1)
BUN SERPL-MCNC: 21 MG/DL (ref 8–23)
CALCIUM SERPL-MCNC: 8.4 MG/DL (ref 8.3–10.4)
CHLORIDE SERPL-SCNC: 99 MMOL/L (ref 98–107)
CO2 SERPL-SCNC: 26 MMOL/L (ref 21–32)
CREAT SERPL-MCNC: 0.77 MG/DL (ref 0.8–1.5)
DIFFERENTIAL METHOD BLD: ABNORMAL
EOSINOPHIL # BLD: 0.1 K/UL (ref 0–0.8)
EOSINOPHIL NFR BLD: 1 % (ref 0.5–7.8)
ERYTHROCYTE [DISTWIDTH] IN BLOOD BY AUTOMATED COUNT: 13.7 % (ref 11.9–14.6)
GLOBULIN SER CALC-MCNC: 5.8 G/DL (ref 2.3–3.5)
GLUCOSE SERPL-MCNC: 91 MG/DL (ref 65–100)
HCT VFR BLD AUTO: 27.9 % (ref 41.1–50.3)
HGB BLD-MCNC: 9.4 G/DL (ref 13.6–17.2)
IMM GRANULOCYTES # BLD AUTO: 0.2 K/UL (ref 0–0.5)
IMM GRANULOCYTES NFR BLD AUTO: 1 % (ref 0–5)
LYMPHOCYTES # BLD: 1 K/UL (ref 0.5–4.6)
LYMPHOCYTES NFR BLD: 5 % (ref 13–44)
MCH RBC QN AUTO: 29.5 PG (ref 26.1–32.9)
MCHC RBC AUTO-ENTMCNC: 33.7 G/DL (ref 31.4–35)
MCV RBC AUTO: 87.5 FL (ref 79.6–97.8)
MONOCYTES # BLD: 1.1 K/UL (ref 0.1–1.3)
MONOCYTES NFR BLD: 5 % (ref 4–12)
NEUTS SEG # BLD: 19.1 K/UL (ref 1.7–8.2)
NEUTS SEG NFR BLD: 89 % (ref 43–78)
NRBC # BLD: 0 K/UL (ref 0–0.2)
PLATELET # BLD AUTO: 656 K/UL (ref 150–450)
PMV BLD AUTO: 8.6 FL (ref 9.4–12.3)
POTASSIUM SERPL-SCNC: 3.6 MMOL/L (ref 3.5–5.1)
PROT SERPL-MCNC: 7.2 G/DL (ref 6.3–8.2)
RBC # BLD AUTO: 3.19 M/UL (ref 4.23–5.6)
SODIUM SERPL-SCNC: 132 MMOL/L (ref 138–145)
VANCOMYCIN TROUGH SERPL-MCNC: 12.1 UG/ML (ref 5–20)
WBC # BLD AUTO: 21.6 K/UL (ref 4.3–11.1)

## 2021-04-18 PROCEDURE — 74011250637 HC RX REV CODE- 250/637: Performed by: HOSPITALIST

## 2021-04-18 PROCEDURE — 80202 ASSAY OF VANCOMYCIN: CPT

## 2021-04-18 PROCEDURE — 94760 N-INVAS EAR/PLS OXIMETRY 1: CPT

## 2021-04-18 PROCEDURE — 74011000258 HC RX REV CODE- 258: Performed by: HOSPITALIST

## 2021-04-18 PROCEDURE — 99232 SBSQ HOSP IP/OBS MODERATE 35: CPT | Performed by: INTERNAL MEDICINE

## 2021-04-18 PROCEDURE — 87116 MYCOBACTERIA CULTURE: CPT

## 2021-04-18 PROCEDURE — 85025 COMPLETE CBC W/AUTO DIFF WBC: CPT

## 2021-04-18 PROCEDURE — 2709999900 HC NON-CHARGEABLE SUPPLY

## 2021-04-18 PROCEDURE — 77010033678 HC OXYGEN DAILY

## 2021-04-18 PROCEDURE — 87149 DNA/RNA DIRECT PROBE: CPT

## 2021-04-18 PROCEDURE — 74011250636 HC RX REV CODE- 250/636: Performed by: HOSPITALIST

## 2021-04-18 PROCEDURE — 65270000029 HC RM PRIVATE

## 2021-04-18 PROCEDURE — 36415 COLL VENOUS BLD VENIPUNCTURE: CPT

## 2021-04-18 PROCEDURE — 87186 SC STD MICRODIL/AGAR DIL: CPT

## 2021-04-18 PROCEDURE — 80053 COMPREHEN METABOLIC PANEL: CPT

## 2021-04-18 RX ORDER — SODIUM CHLORIDE FOR INHALATION 3 %
4 VIAL, NEBULIZER (ML) INHALATION
Status: DISCONTINUED | OUTPATIENT
Start: 2021-04-18 | End: 2021-04-22 | Stop reason: HOSPADM

## 2021-04-18 RX ADMIN — RANOLAZINE 500 MG: 500 TABLET, FILM COATED, EXTENDED RELEASE ORAL at 17:07

## 2021-04-18 RX ADMIN — PIPERACILLIN SODIUM AND TAZOBACTAM SODIUM 3.38 G: 3; .375 INJECTION, POWDER, LYOPHILIZED, FOR SOLUTION INTRAVENOUS at 20:21

## 2021-04-18 RX ADMIN — POLYETHYLENE GLYCOL 3350 17 G: 17 POWDER, FOR SOLUTION ORAL at 08:46

## 2021-04-18 RX ADMIN — Medication 10 ML: at 06:00

## 2021-04-18 RX ADMIN — ATORVASTATIN CALCIUM 20 MG: 20 TABLET, FILM COATED ORAL at 08:47

## 2021-04-18 RX ADMIN — CARVEDILOL 12.5 MG: 12.5 TABLET, FILM COATED ORAL at 17:06

## 2021-04-18 RX ADMIN — SOTALOL HYDROCHLORIDE 120 MG: 80 TABLET ORAL at 17:06

## 2021-04-18 RX ADMIN — SENNOSIDES AND DOCUSATE SODIUM 1 TABLET: 8.6; 5 TABLET ORAL at 08:47

## 2021-04-18 RX ADMIN — Medication 10 ML: at 22:26

## 2021-04-18 RX ADMIN — PANTOPRAZOLE SODIUM 40 MG: 40 TABLET, DELAYED RELEASE ORAL at 06:30

## 2021-04-18 RX ADMIN — MEXILETINE HYDROCHLORIDE 200 MG: 200 CAPSULE ORAL at 20:22

## 2021-04-18 RX ADMIN — ENOXAPARIN SODIUM 40 MG: 40 INJECTION SUBCUTANEOUS at 08:49

## 2021-04-18 RX ADMIN — PIPERACILLIN SODIUM AND TAZOBACTAM SODIUM 3.38 G: 3; .375 INJECTION, POWDER, LYOPHILIZED, FOR SOLUTION INTRAVENOUS at 14:49

## 2021-04-18 RX ADMIN — Medication 10 ML: at 14:52

## 2021-04-18 RX ADMIN — MEXILETINE HYDROCHLORIDE 200 MG: 200 CAPSULE ORAL at 18:00

## 2021-04-18 RX ADMIN — RANOLAZINE 500 MG: 500 TABLET, FILM COATED, EXTENDED RELEASE ORAL at 08:47

## 2021-04-18 RX ADMIN — PIPERACILLIN SODIUM AND TAZOBACTAM SODIUM 3.38 G: 3; .375 INJECTION, POWDER, LYOPHILIZED, FOR SOLUTION INTRAVENOUS at 04:33

## 2021-04-18 RX ADMIN — EZETIMIBE 10 MG: 10 TABLET ORAL at 08:47

## 2021-04-18 RX ADMIN — LISINOPRIL 5 MG: 5 TABLET ORAL at 08:48

## 2021-04-18 RX ADMIN — SOTALOL HYDROCHLORIDE 120 MG: 80 TABLET ORAL at 08:48

## 2021-04-18 RX ADMIN — VANCOMYCIN HYDROCHLORIDE 1250 MG: 10 INJECTION, POWDER, LYOPHILIZED, FOR SOLUTION INTRAVENOUS at 14:49

## 2021-04-18 RX ADMIN — CARVEDILOL 12.5 MG: 12.5 TABLET, FILM COATED ORAL at 08:47

## 2021-04-18 RX ADMIN — SENNOSIDES AND DOCUSATE SODIUM 1 TABLET: 8.6; 5 TABLET ORAL at 20:21

## 2021-04-18 RX ADMIN — HYDROCHLOROTHIAZIDE 25 MG: 25 TABLET ORAL at 08:49

## 2021-04-18 RX ADMIN — CHOLECALCIFEROL TAB 25 MCG (1000 UNIT) 2000 UNITS: 25 TAB at 08:48

## 2021-04-18 RX ADMIN — ASPIRIN 81 MG: 81 TABLET ORAL at 08:49

## 2021-04-18 NOTE — PROGRESS NOTES
Pharmacokinetic Consult to Pharmacist    Jaffreyrg White is a 72 y.o. male being treated  with vancomycin. Height: 5' 9\" (175.3 cm)  Weight: 80.7 kg (178 lb)  Lab Results   Component Value Date/Time    BUN 21 04/18/2021 07:40 AM    Creatinine 0.77 (L) 04/18/2021 07:40 AM    WBC 21.6 (H) 04/18/2021 07:40 AM    Procalcitonin 0.58 04/16/2021 03:53 PM    Lactic acid 1.3 04/16/2021 06:20 PM    Lactic acid 1.7 11/27/2015 11:48 AM      Estimated Creatinine Clearance: 90.5 mL/min (A) (based on SCr of 0.77 mg/dL (L)). Lab Results   Component Value Date/Time    Vancomycin,trough 12.1 04/18/2021 01:00 PM       Day 3 of vancomycin. Goal trough is 15-20. Vancomycin trough resulted at 12.1 but was drawn about 3 hours late. Estimated true trough is 15.7. Will continue vancomycin 1250 mg IV q12h. Further levels will be ordered as clinically indicated. Pharmacy will continue to follow. Please call with any questions.     Thank you,  Kay Nguyen, PharmD, [facility]  Clinical Pharmacist  [phone]

## 2021-04-18 NOTE — PROGRESS NOTES
Vanco trough still pending. Spoke with Yasmin White in lab and made aware. She states that she will have a tech to come and draw as soon as possible. To follow.

## 2021-04-18 NOTE — PROGRESS NOTES
Resting quietly at present. NAD noted. 2/3 AFB sputum cultures collected and sent to lab during this shift. Safety maintained through out the shift. To report off to on coming nurse.

## 2021-04-18 NOTE — PROGRESS NOTES
Resting quietly at present. NAD noted. Safety maintained through out the shift. Aware of need for sputum for AFB cultures. States \"just having dry cough. Have not been able to cough up anything\". To report off to on coming nurse.

## 2021-04-18 NOTE — PROGRESS NOTES
Lab here to draw ordered vanco trough. Made aware that it has already been drawn and resulted. Sputum culture for AFB X1 sent to lab per order. PM assessment done. No changes noted. Respiration even and unlabored 20/min at rest. No s/s of pain noted at present. Encouraged to call with needs.

## 2021-04-18 NOTE — PROGRESS NOTES
Report received from [facility]. Patient resting quietly in bed. Respirations present, even and unlabored on room air. Bed low and locked, safety measures in place. No signs of distress, no needs expressed. Call light within reach, encouraged patient to call with any needs. Will continue to monitor.

## 2021-04-18 NOTE — PROGRESS NOTES
[facility]ist Note     Admit Date:  2021  5:37 PM   Name:  Bethany Roth   Age:  72 y.o.  :  1950   MRN:  [id number]   PCP:  Castro Oliver MD  Treatment Team: Attending Provider: Chuck Eugene MD; Consulting Provider: Kriss Cesar MD; Primary Nurse: Jf Noel RN    HPI/Subjective:     Patient is a 72 y.o. male with medical h/o history of chronic systolic heart failure, status post AICD, COPD, hypertensions, CAD, status post CABG, hyperlipidemia presented to the ED with worsening shortness of breath and cough. Chest CT scan showed right upper lobe consolidation with a 5 cm area of superimposed cavitation. Patient has received Covid vaccine approximately 1 week ago. He was admitted with severe sepsis secondary to suspected bacterial pneumonia with elevated procalcitonin. He was started on vancomycin and Zosyn. Tuberculosis and primary malignancy with cavitation in differential.  Pulmonology consulted. : Patient seen at the bedside. Reports he is feeling better. Not able to cough up sputum. Denies chest pain, palpitation, nausea, vomiting or abdominal pain. Currently on 2 L nasal cannula. Assessment and Plan:     Severe sepsis secondary to suspected bacterial pneumonia:  CT scan with right upper lobe consolidation with a 5 cm area of superimposed cavitation.   Tuberculosis and primary malignancy with cavitation and differential  Follow-up QuantiFERON, Aspergillus Ag, PPD  Not able to obtain sputum for AFB x3 as patient is not able to cough up  Pulmonology consulted and recommend once excluded tuberculosis plan to schedule OP navigational bronchoscopy with biopsy and staging EBUS  Continue vancomycin and Zosyn  Follow-up on cultures  Continue airborne precautions for now    CAD/hypertension/hyperlipidemia/chronic systolic heart failure/post AICD:  Stable  Continue home meds aspirin, atorvastatin, carvedilol, ezetimibe, hydrochlorothiazide, lisinopril    COPD:  Continue nebs treatment  Continue Symbicort and Spiriva    Discharge planning: To be determined    DVT ppx ordered  Code status:  Full  Estimated LOS:  Greater than 2 midnights  Risk:  high    Hospital Problems as of 4/18/2021 Date Reviewed: 8/27/2020          Codes Class Noted - Resolved POA    * (Principal) Cavitary lung disease ICD-10-CM: J98.4  ICD-9-CM: 518.89  4/16/2021 - Present Unknown        GERD (gastroesophageal reflux disease) ICD-10-CM: K21.9  ICD-9-CM: 530.81  Unknown - Present Yes        Ischemic cardiomyopathy ICD-10-CM: I25.5  ICD-9-CM: 414.8  6/30/2017 - Present Yes    Overview Signed 3/19/2020 10:22 AM by Lydia Haji MD     Last Assessment & Plan:   He has no symptoms of volume overload. We will continue his current regimen. Follow-up in 6 months with an echocardiogram prior to his return. Systolic CHF, chronic (HCC) ICD-10-CM: I50.22  ICD-9-CM: 428.22, 428.0  11/15/2011 - Present Yes                10 systems reviewed and negative except as noted in HPI.   Past Medical History:   Diagnosis Date    CAD (coronary artery disease)     CABG 2001, MI 5-2001    Callus of foot     history of removal    Chronic obstructive pulmonary disease ([address]     Coronary atherosclerosis of native coronary vessel 12/28/2015    GERD (gastroesophageal reflux disease)     History of dental examination 01/01/2017    History of heart failure     History of palpitations     Hypertension     OA (osteoarthritis)     Pacemaker     S/P CABG (coronary artery bypass graft)     S/P coronary artery bypass graft x 3     Ventricular arrhythmia       Past Surgical History:   Procedure Laterality Date    HX CATARACT REMOVAL      2-2017 RIGHT EYE AND 3-2017 LEFT EYE    HX HEENT      tooth extraction    HX ORTHOPAEDIC Right     foot callus    HX PACEMAKER      ICD    NM CARDIAC SURG PROCEDURE UNLIST      cabg x3     VASCULAR SURGERY PROCEDURE UNLIST        Allergies   Allergen Reactions    Ventolin [Albuterol Sulfate] Other (comments)     I used it once and woke up in the kitchen floor after my defibrillator went off.  Amiodarone Other (comments)     STUMBLES     Inspra [Eplerenone] Hives    Niaspan [Niacin] Hives    Nitroglycerin Other (comments)     SEVERE HYPOTENSION      Other Medication Anaphylaxis     RASPBERRIES       Social History     Tobacco Use    Smoking status: Former Smoker     Packs/day: 1.50     Years: 30.00     Pack years: 45.00     Types: Cigarettes     Quit date: 4/15/2001     Years since quittin.0    Smokeless tobacco: Never Used   Substance Use Topics    Alcohol use: Yes     Frequency: 2-4 times a month     Drinks per session: 3 or 4     Binge frequency: Never     Comment: social, 3 or less per week      Family History   Problem Relation Age of Onset    Hypertension Father     Heart Disease Brother     Hypertension Brother     No Known Problems Mother     Coronary Artery Disease Other         family hx    No Known Problems Maternal Grandmother     No Known Problems Maternal Grandfather     No Known Problems Paternal Grandmother     No Known Problems Paternal Grandfather       Family history reviewed and noncontributory. Immunization History   Administered Date(s) Administered    Influenza Vaccine 2014, 10/01/2015, 10/01/2016, 2018    Influenza Vaccine (Tri) Adjuvanted (>65 Yrs FLUAD TRI 10087) 2019    Influenza Vaccine Split 2012    Pneumococcal Polysaccharide (PPSV-23) 2016    Pneumococcal Vaccine (Unspecified Type) 2014    TB Skin Test (PPD) Intradermal 2021     PTA Medications:  Prior to Admission Medications   Prescriptions Last Dose Informant Patient Reported? Taking? B.infantis-B.ani-B.long-B.bifi (Probiotic 4X) 10-15 mg TbEC   Yes No   Sig: Take  by mouth. aspirin delayed-release 81 mg tablet   Yes No   Sig: Take 81 mg by mouth daily.    bisacodyL (Dulcolax, bisacodyl,) 5 mg EC tablet Yes No   Sig: Take 5 mg by mouth daily as needed for Constipation. carvediloL (COREG) 12.5 mg tablet   No No   Sig: Take 1 Tab by mouth two (2) times a day. cholecalciferol (Vitamin D3) 25 mcg (1,000 unit) cap   Yes No   Sig: Take  by mouth daily. ezetimibe (ZETIA) 10 mg tablet   No No   Sig: TAKE 1 TABLET DAILY   fluticasone propion-salmeteroL (Advair Diskus) 250-50 mcg/dose diskus inhaler   No No   Sig: USE 1 INHALATION TWICE A DAY IN THE MORNING AND EVENING APPROXIMATELY 12 HOURS APART   fluticasone propionate (FLONASE) 50 mcg/actuation nasal spray   No No   Si Sprays by Both Nostrils route daily. hydroCHLOROthiazide (HYDRODIURIL) 25 mg tablet   No No   Sig: Take 1 Tab by mouth daily. lisinopriL (PRINIVIL, ZESTRIL) 5 mg tablet   No No   Sig: Take 1 Tab by mouth daily. mexiletine (MEXITIL) 200 mg capsule   No No   Sig: Take 1 Cap by mouth two (2) times a day. multivitamin (ONE A DAY) tablet   Yes No   Sig: Take 1 Tab by mouth daily. nitroglycerin (NITROSTAT) 0.4 mg SL tablet   Yes No   Sig: by SubLINGual route every five (5) minutes as needed. pantoprazole (PROTONIX) 40 mg tablet   No No   Sig: TAKE 1 TABLET DAILY   ranolazine ER (Ranexa) 500 mg SR tablet   No No   Sig: Take 1 Tab by mouth two (2) times a day. simvastatin (ZOCOR) 40 mg tablet   No No   Sig: TAKE 1 TABLET NIGHTLY   sotaloL (BETAPACE) 120 mg tablet   No No   Sig: Take 1 Tab by mouth two (2) times a day.    tiotropium (Spiriva with HandiHaler) 18 mcg inhalation capsule   No No   Sig: INHALE THE CONTENTS OF 1 CAPSULE DAILY      Facility-Administered Medications: None       Objective:     Patient Vitals for the past 24 hrs:   Temp Pulse Resp BP SpO2   21 1242 97.7 °F (36.5 °C) 81 16 115/65 92 %   21 0748 98 °F (36.7 °C) 80 18 130/67 94 %   21 0450 98.2 °F (36.8 °C) 82 18 125/77 92 %   21 2315 98.2 °F (36.8 °C) 81 18 112/68 94 %   21 2222     92 %   21 1943 98.8 °F (37.1 °C) 85 18 115/67 (!) 88 %   04/17/21 1921     90 %   04/17/21 1543 98 °F (36.7 °C) 84 18 130/77 93 %     Oxygen Therapy  O2 Sat (%): 92 % (04/18/21 1242)  Pulse via Oximetry: 77 beats per minute (04/18/21 0748)  O2 Device: Nasal cannula (04/18/21 0748)  O2 Flow Rate (L/min): 2 l/min (04/18/21 0748)    Estimated body mass index is 26.29 kg/m² as calculated from the following:    Height as of this encounter: 5' 9\" (1.753 m). Weight as of this encounter: 80.7 kg (178 lb). Intake/Output Summary (Last 24 hours) at 4/18/2021 1348  Last data filed at 4/18/2021 0748  Gross per 24 hour   Intake    Output 775 ml   Net -775 ml       *Note that automatically entered I/Os may not be accurate; dependent on patient compliance with collection and accurate  by assistants. Physical Exam:  General:    Well nourished. Alert. Eyes:   Normal sclerae. Extraocular movements intact. HENT:  Normocephalic, atraumatic. Moist mucous membranes  CV:   RRR. No m/r/g. Lungs:  Diminished breath sounds to right upper lobe  Abdomen: Soft, nontender, nondistended. Extremities: Warm and dry. No cyanosis or edema. Neurologic: CN II-XII grossly intact. Sensation intact. Skin:     No rashes or jaundice. Normal coloration  Psych:  Normal mood and affect. I reviewed the labs, imaging, EKGs, telemetry, and other studies done this admission.   Data Reviewed:   Recent Results (from the past 24 hour(s))   GLUCOSE, POC    Collection Time: 04/17/21 10:36 PM   Result Value Ref Range    Glucose (POC) 101 (H) 65 - 100 mg/dL    Performed by [facility]    METABOLIC PANEL, COMPREHENSIVE    Collection Time: 04/18/21  7:40 AM   Result Value Ref Range    Sodium 132 (L) 138 - 145 mmol/L    Potassium 3.6 3.5 - 5.1 mmol/L    Chloride 99 98 - 107 mmol/L    CO2 26 21 - 32 mmol/L    Anion gap 7 7 - 16 mmol/L    Glucose 91 65 - 100 mg/dL    BUN 21 8 - 23 MG/DL    Creatinine 0.77 (L) 0.8 - 1.5 MG/DL    GFR est AA >60 >60 ml/min/1.73m2    GFR est non-AA >60 >60 ml/min/1.73m2    Calcium 8.4 8.3 - 10.4 MG/DL    Bilirubin, total 1.6 (H) 0.2 - 1.1 MG/DL    ALT (SGPT) 267 (H) 12 - 65 U/L    AST (SGOT) 360 (H) 15 - 37 U/L    Alk. phosphatase 222 (H) 50 - 136 U/L    Protein, total 7.2 6.3 - 8.2 g/dL    Albumin 1.4 (L) 3.2 - 4.6 g/dL    Globulin 5.8 (H) 2.3 - 3.5 g/dL    A-G Ratio 0.2 (L) 1.2 - 3.5     CBC WITH AUTOMATED DIFF    Collection Time: 04/18/21  7:40 AM   Result Value Ref Range    WBC 21.6 (H) 4.3 - 11.1 K/uL    RBC 3.19 (L) 4.23 - 5.6 M/uL    HGB 9.4 (L) 13.6 - 17.2 g/dL    HCT 27.9 (L) 41.1 - 50.3 %    MCV 87.5 79.6 - 97.8 FL    MCH 29.5 26.1 - 32.9 PG    MCHC 33.7 31.4 - 35.0 g/dL    RDW 13.7 11.9 - 14.6 %    PLATELET 032 (H) 689 - 450 K/uL    MPV 8.6 (L) 9.4 - 12.3 FL    ABSOLUTE NRBC 0.00 0.0 - 0.2 K/uL    DF AUTOMATED      NEUTROPHILS 89 (H) 43 - 78 %    LYMPHOCYTES 5 (L) 13 - 44 %    MONOCYTES 5 4.0 - 12.0 %    EOSINOPHILS 1 0.5 - 7.8 %    BASOPHILS 0 0.0 - 2.0 %    IMMATURE GRANULOCYTES 1 0.0 - 5.0 %    ABS. NEUTROPHILS 19.1 (H) 1.7 - 8.2 K/UL    ABS. LYMPHOCYTES 1.0 0.5 - 4.6 K/UL    ABS. MONOCYTES 1.1 0.1 - 1.3 K/UL    ABS. EOSINOPHILS 0.1 0.0 - 0.8 K/UL    ABS. BASOPHILS 0.0 0.0 - 0.2 K/UL    ABS. IMM.  GRANS. 0.2 0.0 - 0.5 K/UL       All Micro Results     Procedure Component Value Units Date/Time    CULTURE, BLOOD [[id number]] Collected: 04/17/21 0832    Order Status: Completed Specimen: Blood Updated: 04/18/21 0912     Special Requests: --        LEFT  HAND       Culture result: NO GROWTH AFTER 23 HOURS       CULTURE, BLOOD [[id number]] Collected: 04/16/21 1820    Order Status: Completed Specimen: Blood Updated: 04/18/21 0912     Special Requests: --        NO SPECIAL REQUESTS  RIGHT  ARM       Culture result: NO GROWTH 2 DAYS       AFB CULTURE + SMEAR W/RFLX ID FROM CULTURE [[id number]]     Order Status: Sent     AFB CULTURE + SMEAR W/RFLX ID FROM CULTURE [[id number]]     Order Status: Vesta Wilhelm GOLD PLUS [[id number]]     Order Status: Sent Specimen: Blood           Current Facility-Administered Medications   Medication Dose Route Frequency    sodium chloride 3% hypertonic nebulizer soln  4 mL Nebulization BID PRN    nitroglycerin (NITROSTAT) tablet 0.4 mg  0.4 mg SubLINGual PRN    aspirin delayed-release tablet 81 mg  81 mg Oral DAILY    bisacodyL (DULCOLAX) tablet 5 mg  5 mg Oral DAILY PRN    cholecalciferol (VITAMIN D3) (1000 Units /25 mcg) tablet 2,000 Units  2,000 Units Oral DAILY    mexiletine (MEXITIL) capsule 200 mg (Patient Supplied)  200 mg Oral BID    fluticasone propionate (FLONASE) 50 mcg/actuation nasal spray 2 Spray  2 Spray Both Nostrils DAILY    carvediloL (COREG) tablet 12.5 mg  12.5 mg Oral BID    ezetimibe (ZETIA) tablet 10 mg  10 mg Oral DAILY    hydroCHLOROthiazide (HYDRODIURIL) tablet 25 mg  25 mg Oral DAILY    lisinopriL (PRINIVIL, ZESTRIL) tablet 5 mg  5 mg Oral DAILY    pantoprazole (PROTONIX) tablet 40 mg  40 mg Oral ACB    atorvastatin (LIPITOR) tablet 20 mg  20 mg Oral DAILY    sotaloL (BETAPACE) tablet 120 mg  120 mg Oral BID    ranolazine ER (RANEXA) tablet 500 mg  500 mg Oral BID    budesonide-formoteroL (SYMBICORT) 160-4.5 mcg/actuation HFA inhaler 2 Puff  2 Puff Inhalation BID RT    sodium chloride (NS) flush 5-40 mL  5-40 mL IntraVENous Q8H    sodium chloride (NS) flush 5-40 mL  5-40 mL IntraVENous PRN    acetaminophen (TYLENOL) tablet 650 mg  650 mg Oral Q6H PRN    Or    acetaminophen (TYLENOL) suppository 650 mg  650 mg Rectal Q6H PRN    polyethylene glycol (MIRALAX) packet 17 g  17 g Oral DAILY    senna-docusate (PERICOLACE) 8.6-50 mg per tablet 1 Tab  1 Tab Oral BID    magnesium hydroxide (MILK OF MAGNESIA) 400 mg/5 mL oral suspension 30 mL  30 mL Oral DAILY PRN    bisacodyL (DULCOLAX) suppository 10 mg  10 mg Rectal DAILY PRN    enoxaparin (LOVENOX) injection 40 mg  40 mg SubCUTAneous DAILY    tiotropium bromide (SPIRIVA RESPIMAT) 2.5 mcg /actuation  2 Puff Inhalation DAILY  vancomycin trough reminder   Other ONCE    piperacillin-tazobactam (ZOSYN) 3.375 g in 0.9% sodium chloride (MBP/ADV) 100 mL MBP  3.375 g IntraVENous Q8H    vancomycin (VANCOCIN) 1250 mg in  ml infusion  1,250 mg IntraVENous Q12H       Other Studies:  No results found for this visit on 04/16/21. No results found. SARS-CoV-2 Lab Results  \"Novel Coronavirus\" Test: No results found for: COV2NT   \"Emergent Disease\" Test: No results found for: EDPR  \"SARS-COV-2\" Test: No results found for: XGCOVT  Rapid Test: No results found for: COVR            Part of this note was written by using a voice dictation software and the note has been proof read but may still contain some grammatical/other typographical errors.     Signed:  Hortensia White MD

## 2021-04-18 NOTE — PROGRESS NOTES
Reviewed notes for spiritual concerns    Did not visit due to precautions    Will continue to assess how we can best serve this family

## 2021-04-18 NOTE — PROGRESS NOTES
Jesus Martin  Admission Date: 4/16/2021             Daily Progress Note: 4/18/2021   Patient is a 72 y.o.  male known to our office, last seen in 2017 by Dr. Amberly Boyd. He has a PMHx Gold stage II COPD, chronic systolic heart failure, s/p AICD, HTN, former smoker (quit 2001), balance issues, CAD, s/p CABG, and hyperlipidmia. Received COVID vaccine approx 1 week ago. He presented to the ED on 4/16 with c/o SOB and cough. Chest CT schan showed RUL with a 5 cm area of superimposed cavitation. This could represent PNA with necrotic cavitation, as well as, primary malignancy with cavitation. Right hilar and mediastinal adenopathy indeterminate for reactive or malignant. WBC 30.8, Procal 0.58, and LA 1.3 on admission. He was started on zosyn and vanc for treatment sepsis sucspected secondary to PNA and placed on Airborne precautions. Quantiferon and aspergillus pending. Blood cultures NGTD. Pulmonary was consulted for cavitary lung lesion in RUL. AFB cultures pending, pt has been unable to cough up sputum sample at present. Subjective:   On 2 LPM NC with O2 sat 94%. Unable to obtain sputum sample at present. Feeling better. Sitting up in bed.      Review of Systems  Constitutional: positive for fatigue, negative for fevers, chills and sweats  Respiratory: positive for cough or dyspnea on exertion, negative for sputum  Cardiovascular: negative for chest pain, chest pressure/discomfort  Gastrointestinal: negative for nausea, vomiting, diarrhea and constipation    Current Facility-Administered Medications   Medication Dose Route Frequency    nitroglycerin (NITROSTAT) tablet 0.4 mg  0.4 mg SubLINGual PRN    aspirin delayed-release tablet 81 mg  81 mg Oral DAILY    bisacodyL (DULCOLAX) tablet 5 mg  5 mg Oral DAILY PRN    cholecalciferol (VITAMIN D3) (1000 Units /25 mcg) tablet 2,000 Units  2,000 Units Oral DAILY    mexiletine (MEXITIL) capsule 200 mg (Patient Supplied)  200 mg Oral BID    fluticasone propionate (FLONASE) 50 mcg/actuation nasal spray 2 Spray  2 Spray Both Nostrils DAILY    carvediloL (COREG) tablet 12.5 mg  12.5 mg Oral BID    ezetimibe (ZETIA) tablet 10 mg  10 mg Oral DAILY    hydroCHLOROthiazide (HYDRODIURIL) tablet 25 mg  25 mg Oral DAILY    lisinopriL (PRINIVIL, ZESTRIL) tablet 5 mg  5 mg Oral DAILY    pantoprazole (PROTONIX) tablet 40 mg  40 mg Oral ACB    atorvastatin (LIPITOR) tablet 20 mg  20 mg Oral DAILY    sotaloL (BETAPACE) tablet 120 mg  120 mg Oral BID    ranolazine ER (RANEXA) tablet 500 mg  500 mg Oral BID    budesonide-formoteroL (SYMBICORT) 160-4.5 mcg/actuation HFA inhaler 2 Puff  2 Puff Inhalation BID RT    sodium chloride (NS) flush 5-40 mL  5-40 mL IntraVENous Q8H    sodium chloride (NS) flush 5-40 mL  5-40 mL IntraVENous PRN    acetaminophen (TYLENOL) tablet 650 mg  650 mg Oral Q6H PRN    Or    acetaminophen (TYLENOL) suppository 650 mg  650 mg Rectal Q6H PRN    polyethylene glycol (MIRALAX) packet 17 g  17 g Oral DAILY    senna-docusate (PERICOLACE) 8.6-50 mg per tablet 1 Tab  1 Tab Oral BID    magnesium hydroxide (MILK OF MAGNESIA) 400 mg/5 mL oral suspension 30 mL  30 mL Oral DAILY PRN    bisacodyL (DULCOLAX) suppository 10 mg  10 mg Rectal DAILY PRN    enoxaparin (LOVENOX) injection 40 mg  40 mg SubCUTAneous DAILY    tiotropium bromide (SPIRIVA RESPIMAT) 2.5 mcg /actuation  2 Puff Inhalation DAILY    vancomycin trough reminder   Other ONCE    tuberculin injection 5 Units  5 Units IntraDERMal ONCE    piperacillin-tazobactam (ZOSYN) 3.375 g in 0.9% sodium chloride (MBP/ADV) 100 mL MBP  3.375 g IntraVENous Q8H    vancomycin (VANCOCIN) 1250 mg in  ml infusion  1,250 mg IntraVENous Q12H         Objective:     Vitals:    04/17/21 2315 04/18/21 0450 04/18/21 0615 04/18/21 0748   BP: 112/68 125/77  130/67   Pulse: 81 82  80   Resp: 18 18  18   Temp: 98.2 °F (36.8 °C) 98.2 °F (36.8 °C)  98 °F (36.7 °C)   SpO2: 94% 92%  94%   Weight:   178 lb (80.7 kg)    Height:         Intake and Output:   04/16 1901 - 04/18 0700  In: 890 [P.O.:240;  I.V.:650]  Out: 650 [Urine:650]  04/18 0701 - 04/18 1900  In: -   Out: 300 [Urine:300]      Intake/Output Summary (Last 24 hours) at 4/18/2021 1036  Last data filed at 4/18/2021 0748  Gross per 24 hour   Intake    Output 775 ml   Net -775 ml       Physical Exam:            GEN: well developed and in no acute distress,   HEENT:  PERRL, EOMI, no alar flaring or epistaxis, oral mucosa moist without cyanosis,   NECK:  no JVD, no retractions, no thyromegaly or masses,   LUNGS:  Diminished to RUL, clear otherwise   HEART:  RRR with no M,G,R;  ABDOMEN:  soft with no tenderness, positive bowel sounds present  EXTREMITIES:  warm with no cyanosis, no edema  SKIN:  no jaundice or ecchymosis   NEURO:  alert and oriented, grossly non-focal    CHEST X-RAYS:  04/16/2021 07/22/2016       CT:   04/16/2021                LAB  Recent Labs     04/18/21  0740 04/17/21  0833 04/16/21  1553   WBC 21.6* 26.9* 30.8*   HGB 9.4* 9.6* 10.5*   HCT 27.9* 28.2* 30.9*   * 682* 758*     Recent Labs     04/18/21  0740 04/17/21  0833 04/16/21  1553   * 134* 132*   K 3.6 3.5 3.9   CL 99 100 96*   CO2 26 25 25   GLU 91 97 107*   BUN 21 26* 33*   CREA 0.77* 0.91 1.08   MG  --  2.1 2.2     ABG:  No results found for: PH, PHI, PCO2, PCO2I, PO2, PO2I, HCO3, HCO3I, FIO2, FIO2I    Cultures:   Results     Procedure Component Value Units Date/Time    AFB CULTURE + SMEAR W/RFLX ID FROM CULTURE [[id number]]     Order Status: Sent     AFB CULTURE + SMEAR W/RFLX ID FROM CULTURE [[id number]]     Order Status: Sent     CULTURE, BLOOD [[id number]] Collected: 04/17/21 0832    Order Status: Completed Specimen: Blood Updated: 04/18/21 0912     Special Requests: --        LEFT  HAND       Culture result: NO GROWTH AFTER 23 HOURS       QUANTIFERON-TB GOLD PLUS [[id number]]     Order Status: Sent Specimen: Blood     CULTURE, BLOOD [[id number]] Collected: 04/16/21 1820    Order Status: Completed Specimen: Blood Updated: 04/18/21 0912     Special Requests: --        NO SPECIAL REQUESTS  RIGHT  ARM       Culture result: NO GROWTH 2 DAYS               Assessment:     Hospital Problems  Date Reviewed: 8/27/2020          Codes Class Noted POA    * (Principal) Cavitary lung disease ICD-10-CM: J98.4  ICD-9-CM: 518.89  4/16/2021 Unknown        GERD (gastroesophageal reflux disease) ICD-10-CM: K21.9  ICD-9-CM: 530.81  Unknown Yes        Ischemic cardiomyopathy ICD-10-CM: I25.5  ICD-9-CM: 414.8  6/30/2017 Yes    Overview Signed 3/19/2020 10:22 AM by Valentin Triplett MD     Last Assessment & Plan:   He has no symptoms of volume overload. We will continue his current regimen. Follow-up in 6 months with an echocardiogram prior to his return. Systolic CHF, chronic (HCC) ICD-10-CM: I50.22  ICD-9-CM: 428.22, 428.0  11/15/2011 Yes              PLAN:   --obtain sputum AFB x 3 when able to cough up  --PPD placed and pending  --aspergillus and quantiferon pending  --vanc/zosyn D2  --cont inhalers   --once TB excluded plans to scheudle OP Navigation bronchoscopy with biopsy and staging EBUS  --cont airborne precautions for now  --follow cultures      Екатерина Cameron NP    More than 50% of time documented was spent in face-to-face contact with the patient and in the care of the patient on the floor/unit where the patient is located. The patient has been seen and examined by me personally, the chart,labs, and radiographic studies have been reviewed. Chest: CTA  Extremities: trace edema    I agree with the above assessment and plan.     Melissa Sanders MD.

## 2021-04-19 LAB
ALBUMIN SERPL-MCNC: 1.4 G/DL (ref 3.2–4.6)
ALBUMIN/GLOB SERPL: 0.2 {RATIO} (ref 1.2–3.5)
ALP SERPL-CCNC: 231 U/L (ref 50–136)
ALT SERPL-CCNC: 227 U/L (ref 12–65)
ANION GAP SERPL CALC-SCNC: 9 MMOL/L (ref 7–16)
AST SERPL-CCNC: 222 U/L (ref 15–37)
BASOPHILS # BLD: 0 K/UL (ref 0–0.2)
BASOPHILS NFR BLD: 0 % (ref 0–2)
BILIRUB SERPL-MCNC: 1.3 MG/DL (ref 0.2–1.1)
BUN SERPL-MCNC: 14 MG/DL (ref 8–23)
CALCIUM SERPL-MCNC: 8.6 MG/DL (ref 8.3–10.4)
CHLORIDE SERPL-SCNC: 96 MMOL/L (ref 98–107)
CO2 SERPL-SCNC: 25 MMOL/L (ref 21–32)
CREAT SERPL-MCNC: 0.76 MG/DL (ref 0.8–1.5)
DIFFERENTIAL METHOD BLD: ABNORMAL
EOSINOPHIL # BLD: 0.1 K/UL (ref 0–0.8)
EOSINOPHIL NFR BLD: 1 % (ref 0.5–7.8)
ERYTHROCYTE [DISTWIDTH] IN BLOOD BY AUTOMATED COUNT: 13.9 % (ref 11.9–14.6)
GLOBULIN SER CALC-MCNC: 6.1 G/DL (ref 2.3–3.5)
GLUCOSE SERPL-MCNC: 88 MG/DL (ref 65–100)
HAV IGM SER QL: NONREACTIVE
HBV CORE IGM SER QL: NONREACTIVE
HBV SURFACE AG SER QL: NONREACTIVE
HCT VFR BLD AUTO: 28.7 % (ref 41.1–50.3)
HCV AB SER QL: NONREACTIVE
HGB BLD-MCNC: 9.8 G/DL (ref 13.6–17.2)
IMM GRANULOCYTES # BLD AUTO: 0.1 K/UL (ref 0–0.5)
IMM GRANULOCYTES NFR BLD AUTO: 1 % (ref 0–5)
LYMPHOCYTES # BLD: 0.9 K/UL (ref 0.5–4.6)
LYMPHOCYTES NFR BLD: 5 % (ref 13–44)
MCH RBC QN AUTO: 29.6 PG (ref 26.1–32.9)
MCHC RBC AUTO-ENTMCNC: 34.1 G/DL (ref 31.4–35)
MCV RBC AUTO: 86.7 FL (ref 79.6–97.8)
MM INDURATION POC: 0 MM (ref 0–5)
MONOCYTES # BLD: 1 K/UL (ref 0.1–1.3)
MONOCYTES NFR BLD: 5 % (ref 4–12)
NEUTS SEG # BLD: 16.1 K/UL (ref 1.7–8.2)
NEUTS SEG NFR BLD: 88 % (ref 43–78)
NRBC # BLD: 0 K/UL (ref 0–0.2)
PLATELET # BLD AUTO: 690 K/UL (ref 150–450)
PMV BLD AUTO: 8.8 FL (ref 9.4–12.3)
POTASSIUM SERPL-SCNC: 3.4 MMOL/L (ref 3.5–5.1)
PPD POC: NEGATIVE NEGATIVE
PROT SERPL-MCNC: 7.5 G/DL (ref 6.3–8.2)
RBC # BLD AUTO: 3.31 M/UL (ref 4.23–5.6)
SODIUM SERPL-SCNC: 130 MMOL/L (ref 136–145)
WBC # BLD AUTO: 18.2 K/UL (ref 4.3–11.1)

## 2021-04-19 PROCEDURE — 77010033678 HC OXYGEN DAILY

## 2021-04-19 PROCEDURE — 36415 COLL VENOUS BLD VENIPUNCTURE: CPT

## 2021-04-19 PROCEDURE — 74011000258 HC RX REV CODE- 258: Performed by: HOSPITALIST

## 2021-04-19 PROCEDURE — 87305 ASPERGILLUS AG IA: CPT

## 2021-04-19 PROCEDURE — 74011250637 HC RX REV CODE- 250/637: Performed by: HOSPITALIST

## 2021-04-19 PROCEDURE — 74011250637 HC RX REV CODE- 250/637: Performed by: FAMILY MEDICINE

## 2021-04-19 PROCEDURE — 74011250636 HC RX REV CODE- 250/636: Performed by: HOSPITALIST

## 2021-04-19 PROCEDURE — 85025 COMPLETE CBC W/AUTO DIFF WBC: CPT

## 2021-04-19 PROCEDURE — 94640 AIRWAY INHALATION TREATMENT: CPT

## 2021-04-19 PROCEDURE — 94760 N-INVAS EAR/PLS OXIMETRY 1: CPT

## 2021-04-19 PROCEDURE — 80053 COMPREHEN METABOLIC PANEL: CPT

## 2021-04-19 PROCEDURE — 86480 TB TEST CELL IMMUN MEASURE: CPT

## 2021-04-19 PROCEDURE — 80074 ACUTE HEPATITIS PANEL: CPT

## 2021-04-19 PROCEDURE — 99232 SBSQ HOSP IP/OBS MODERATE 35: CPT | Performed by: INTERNAL MEDICINE

## 2021-04-19 PROCEDURE — 65270000029 HC RM PRIVATE

## 2021-04-19 RX ORDER — POTASSIUM CHLORIDE 20 MEQ/1
40 TABLET, EXTENDED RELEASE ORAL
Status: COMPLETED | OUTPATIENT
Start: 2021-04-19 | End: 2021-04-19

## 2021-04-19 RX ADMIN — ATORVASTATIN CALCIUM 20 MG: 20 TABLET, FILM COATED ORAL at 08:53

## 2021-04-19 RX ADMIN — BUDESONIDE AND FORMOTEROL FUMARATE DIHYDRATE 2 PUFF: 160; 4.5 AEROSOL RESPIRATORY (INHALATION) at 21:09

## 2021-04-19 RX ADMIN — FLUTICASONE PROPIONATE 2 SPRAY: 50 SPRAY, METERED NASAL at 08:51

## 2021-04-19 RX ADMIN — BUDESONIDE AND FORMOTEROL FUMARATE DIHYDRATE 2 PUFF: 160; 4.5 AEROSOL RESPIRATORY (INHALATION) at 07:29

## 2021-04-19 RX ADMIN — VANCOMYCIN HYDROCHLORIDE 1250 MG: 10 INJECTION, POWDER, LYOPHILIZED, FOR SOLUTION INTRAVENOUS at 21:24

## 2021-04-19 RX ADMIN — PIPERACILLIN SODIUM AND TAZOBACTAM SODIUM 3.38 G: 3; .375 INJECTION, POWDER, LYOPHILIZED, FOR SOLUTION INTRAVENOUS at 21:24

## 2021-04-19 RX ADMIN — PIPERACILLIN SODIUM AND TAZOBACTAM SODIUM 3.38 G: 3; .375 INJECTION, POWDER, LYOPHILIZED, FOR SOLUTION INTRAVENOUS at 05:05

## 2021-04-19 RX ADMIN — ENOXAPARIN SODIUM 40 MG: 40 INJECTION SUBCUTANEOUS at 08:53

## 2021-04-19 RX ADMIN — SENNOSIDES AND DOCUSATE SODIUM 1 TABLET: 8.6; 5 TABLET ORAL at 21:24

## 2021-04-19 RX ADMIN — Medication 10 ML: at 21:25

## 2021-04-19 RX ADMIN — CHOLECALCIFEROL TAB 25 MCG (1000 UNIT) 2000 UNITS: 25 TAB at 08:53

## 2021-04-19 RX ADMIN — VANCOMYCIN HYDROCHLORIDE 1250 MG: 10 INJECTION, POWDER, LYOPHILIZED, FOR SOLUTION INTRAVENOUS at 08:52

## 2021-04-19 RX ADMIN — VANCOMYCIN HYDROCHLORIDE 1250 MG: 10 INJECTION, POWDER, LYOPHILIZED, FOR SOLUTION INTRAVENOUS at 00:28

## 2021-04-19 RX ADMIN — Medication 10 ML: at 12:03

## 2021-04-19 RX ADMIN — SOTALOL HYDROCHLORIDE 120 MG: 80 TABLET ORAL at 16:13

## 2021-04-19 RX ADMIN — TIOTROPIUM BROMIDE INHALATION SPRAY 2 PUFF: 3.12 SPRAY, METERED RESPIRATORY (INHALATION) at 07:29

## 2021-04-19 RX ADMIN — ASPIRIN 81 MG: 81 TABLET ORAL at 08:52

## 2021-04-19 RX ADMIN — HYDROCHLOROTHIAZIDE 25 MG: 25 TABLET ORAL at 08:52

## 2021-04-19 RX ADMIN — PIPERACILLIN SODIUM AND TAZOBACTAM SODIUM 3.38 G: 3; .375 INJECTION, POWDER, LYOPHILIZED, FOR SOLUTION INTRAVENOUS at 12:03

## 2021-04-19 RX ADMIN — SOTALOL HYDROCHLORIDE 120 MG: 80 TABLET ORAL at 08:52

## 2021-04-19 RX ADMIN — Medication 10 ML: at 06:16

## 2021-04-19 RX ADMIN — CARVEDILOL 12.5 MG: 12.5 TABLET, FILM COATED ORAL at 08:52

## 2021-04-19 RX ADMIN — EZETIMIBE 10 MG: 10 TABLET ORAL at 08:52

## 2021-04-19 RX ADMIN — LISINOPRIL 5 MG: 5 TABLET ORAL at 08:53

## 2021-04-19 RX ADMIN — PANTOPRAZOLE SODIUM 40 MG: 40 TABLET, DELAYED RELEASE ORAL at 06:16

## 2021-04-19 RX ADMIN — RANOLAZINE 500 MG: 500 TABLET, FILM COATED, EXTENDED RELEASE ORAL at 16:13

## 2021-04-19 RX ADMIN — CARVEDILOL 12.5 MG: 12.5 TABLET, FILM COATED ORAL at 16:13

## 2021-04-19 RX ADMIN — MEXILETINE HYDROCHLORIDE 200 MG: 200 CAPSULE ORAL at 20:30

## 2021-04-19 RX ADMIN — POTASSIUM CHLORIDE 40 MEQ: 20 TABLET, EXTENDED RELEASE ORAL at 15:49

## 2021-04-19 RX ADMIN — RANOLAZINE 500 MG: 500 TABLET, FILM COATED, EXTENDED RELEASE ORAL at 08:53

## 2021-04-19 NOTE — PROGRESS NOTES
MSN, cM:  Spoke with patient this AM about discharge planning. Patient lives alone in his own [place]. Patient has a brother Juliann Goldstein" that lives locally. Patient is independent with all ADL's and requires no equipment for ambulation. Patient denies any home oxygen, HH or rehab in the past.  Patient confirms PCP is Dr. Donice Kawasaki and he drives himself to all appointments. PT and OT consulted for evaluation and recommendations. Case Management will continue to follow for discharge needs. Care Management Interventions  PCP Verified by CM: Yes(Dr. Donice Kawasaki)  Mode of Transport at Discharge: Other (see comment)(family to transport)  Transition of Care Consult (CM Consult): Discharge Planning  Physical Therapy Consult: Yes  Occupational Therapy Consult: Yes  Current Support Network:  Other(RV - in RV park)  Confirm Follow Up Transport: Self  Freedom of Choice List was Provided with Basic Dialogue that Supports the Patient's Individualized Plan of Care/Goals, Treatment Preferences and Shares the Quality Data Associated with the Providers?: Yes  Discharge Location  Discharge Placement: Home

## 2021-04-19 NOTE — PROGRESS NOTES
Pt resting in bed comfortably at this time, alert and oriented times 4. No distress noted, respirations even and unlabored. Pt denies pain at this time. Pt instructed to call for assistance if needed. Will prepare for bedside shift report.

## 2021-04-19 NOTE — PROGRESS NOTES
Pt resting in bed comfortably at this time, alert and oriented times 4. No distress noted, respirations even and unlabored on 2 L NC. Pt denies pain at this time. Pt instructed to call for assistance if needed, call light in place, will continue to monitor.

## 2021-04-19 NOTE — PROGRESS NOTES
Pt is resting in bed at this time watching TV. Pt is alert and oriented times 4. Pt has flat affect. Pt is on Airborne precautions. Pt is on 2L NC. Pt has no s/sx of acute distress at this time. Pt has safety measures in place. Pt has call light within reach and is encouraged to call for assistance if needed.

## 2021-04-19 NOTE — PROGRESS NOTES
Pt refusing to take mexitil 200 mg capsule at scheduled time for this evening. States he will only take it at 8 pm. Medication moved to 8 pm on MAR.

## 2021-04-19 NOTE — PROGRESS NOTES
Michael Men  Admission Date: 4/16/2021             Daily Progress Note: 4/19/2021   Patient is a 72 y.o.  male known to our office, last seen in 2017 by Dr. Daniel Cohn. He has a PMHx Gold stage II COPD, chronic systolic heart failure, s/p AICD, HTN, former smoker (quit 2001), balance issues, CAD, s/p CABG, and hyperlipidmia. Received COVID vaccine approx 1 week ago. He presented to the ED on 4/16 with c/o SOB and cough. Chest CT schan showed RUL with a 5 cm area of superimposed cavitation. This could represent PNA with necrotic cavitation, as well as, primary malignancy with cavitation. Right hilar and mediastinal adenopathy indeterminate for reactive or malignant. WBC 30.8, Procal 0.58, and LA 1.3 on admission. He was started on zosyn and vanc for treatment sepsis sucspected secondary to PNA and placed on Airborne precautions. Quantiferon and aspergillus pending. Blood cultures NGTD. Pulmonary was consulted for cavitary lung lesion in RUL. AFB cultures pending, pt has been unable to cough up sputum sample at present.      Subjective:     On 2L O2 NC with O2 sat 92%   Sputum cultures pending  Remains on Airborne precautions        Current Facility-Administered Medications   Medication Dose Route Frequency    sodium chloride 3% hypertonic nebulizer soln  4 mL Nebulization BID PRN    nitroglycerin (NITROSTAT) tablet 0.4 mg  0.4 mg SubLINGual PRN    aspirin delayed-release tablet 81 mg  81 mg Oral DAILY    bisacodyL (DULCOLAX) tablet 5 mg  5 mg Oral DAILY PRN    cholecalciferol (VITAMIN D3) (1000 Units /25 mcg) tablet 2,000 Units  2,000 Units Oral DAILY    mexiletine (MEXITIL) capsule 200 mg (Patient Supplied)  200 mg Oral BID    fluticasone propionate (FLONASE) 50 mcg/actuation nasal spray 2 Spray  2 Spray Both Nostrils DAILY    carvediloL (COREG) tablet 12.5 mg  12.5 mg Oral BID    ezetimibe (ZETIA) tablet 10 mg  10 mg Oral DAILY    hydroCHLOROthiazide (HYDRODIURIL) tablet 25 mg  25 mg Oral DAILY    lisinopriL (PRINIVIL, ZESTRIL) tablet 5 mg  5 mg Oral DAILY    pantoprazole (PROTONIX) tablet 40 mg  40 mg Oral ACB    atorvastatin (LIPITOR) tablet 20 mg  20 mg Oral DAILY    sotaloL (BETAPACE) tablet 120 mg  120 mg Oral BID    ranolazine ER (RANEXA) tablet 500 mg  500 mg Oral BID    budesonide-formoteroL (SYMBICORT) 160-4.5 mcg/actuation HFA inhaler 2 Puff  2 Puff Inhalation BID RT    sodium chloride (NS) flush 5-40 mL  5-40 mL IntraVENous Q8H    sodium chloride (NS) flush 5-40 mL  5-40 mL IntraVENous PRN    acetaminophen (TYLENOL) tablet 650 mg  650 mg Oral Q6H PRN    Or    acetaminophen (TYLENOL) suppository 650 mg  650 mg Rectal Q6H PRN    polyethylene glycol (MIRALAX) packet 17 g  17 g Oral DAILY    senna-docusate (PERICOLACE) 8.6-50 mg per tablet 1 Tab  1 Tab Oral BID    magnesium hydroxide (MILK OF MAGNESIA) 400 mg/5 mL oral suspension 30 mL  30 mL Oral DAILY PRN    bisacodyL (DULCOLAX) suppository 10 mg  10 mg Rectal DAILY PRN    enoxaparin (LOVENOX) injection 40 mg  40 mg SubCUTAneous DAILY    tiotropium bromide (SPIRIVA RESPIMAT) 2.5 mcg /actuation  2 Puff Inhalation DAILY    piperacillin-tazobactam (ZOSYN) 3.375 g in 0.9% sodium chloride (MBP/ADV) 100 mL MBP  3.375 g IntraVENous Q8H    vancomycin (VANCOCIN) 1250 mg in  ml infusion  1,250 mg IntraVENous Q12H       Review of Systems  Constitutional: positive for fatigue, negative for fevers, chills and sweats  Respiratory: positive for cough or dyspnea on exertion, negative for sputum  Cardiovascular: negative for chest pain, chest pressure/discomfort  Gastrointestinal: negative for nausea, vomiting, diarrhea and constipation      Objective:     Vitals:    04/18/21 2303 04/19/21 0408 04/19/21 0729 04/19/21 0833   BP: 121/70 122/68  110/69   Pulse: 75 72  90   Resp: 18 18 18   Temp: 97.9 °F (36.6 °C) 98 °F (36.7 °C)  97.6 °F (36.4 °C)   SpO2: 95% 96% 95% 92%   Weight:  179 lb 12.8 oz (81.6 kg)     Height: Intake and Output:   04/17 1901 - 04/19 0700  In: 480 [P.O.:480]  Out: 1375 [Urine:1375]  04/19 0701 - 04/19 1900  In: 120 [P.O.:120]  Out: 100 [Urine:100]      Intake/Output Summary (Last 24 hours) at 4/19/2021 1150  Last data filed at 4/19/2021 0859  Gross per 24 hour   Intake 600 ml   Output 700 ml   Net -100 ml       Physical Exam:            GEN: well developed and in no acute distress,   HEENT:  PERRL, EOMI, no alar flaring or epistaxis, oral mucosa moist without cyanosis,   NECK:  no JVD, no retractions, no thyromegaly or masses,   LUNGS:  Diminished to RUL, On 2L O2 NC  HEART:  RRR with no M,G,R;  ABDOMEN:  soft with no tenderness, positive bowel sounds present  EXTREMITIES:  warm with no cyanosis, no edema  SKIN:  no jaundice or ecchymosis   NEURO:  alert and oriented, grossly non-focal      CHEST X-RAYS:  04/16/2021 07/22/2016       CT:   04/16/2021                LAB  Recent Labs     04/19/21  0647 04/18/21  0740 04/17/21  0833   WBC 18.2* 21.6* 26.9*   HGB 9.8* 9.4* 9.6*   HCT 28.7* 27.9* 28.2*   * 656* 682*     Recent Labs     04/19/21  0647 04/18/21  0740 04/17/21  0833 04/16/21  1553   * 132* 134* 132*   K 3.4* 3.6 3.5 3.9   CL 96* 99 100 96*   CO2 25 26 25 25   GLU 88 91 97 107*   BUN 14 21 26* 33*   CREA 0.76* 0.77* 0.91 1.08   MG  --   --  2.1 2.2     ABG:  No results found for: PH, PHI, PCO2, PCO2I, PO2, PO2I, HCO3, HCO3I, FIO2, FIO2I    Cultures:   Results     Procedure Component Value Units Date/Time    AFB CULTURE + SMEAR W/RFLX ID FROM CULTURE [[id number]] Collected: 04/18/21 2032    Order Status: Completed Updated: 04/18/21 2316    AFB CULTURE + SMEAR W/RFLX ID FROM CULTURE [[id number]] Collected: 04/18/21 1814    Order Status: Completed Updated: 04/18/21 1852    AFB CULTURE + SMEAR W/RFLX ID FROM CULTURE [[id number]] Collected: 04/18/21 1510    Order Status: Completed Updated: 04/18/21 1617    CULTURE, BLOOD [[id number]] Collected: 04/17/21 0832    Order Status: Completed Specimen: Blood Updated: 04/19/21 1058     Special Requests: --        LEFT  HAND       Culture result: NO GROWTH 2 DAYS       QUANTIFERON-TB GOLD PLUS [[id number]]     Order Status: Sent Specimen: Blood     CULTURE, BLOOD [[id number]] Collected: 04/16/21 1820    Order Status: Completed Specimen: Blood Updated: 04/19/21 1058     Special Requests: --        NO SPECIAL REQUESTS  RIGHT  ARM       Culture result: NO GROWTH 3 DAYS               Assessment:     Hospital Problems  Date Reviewed: 4/19/2021          Codes Class Noted POA    * (Principal) Cavitary lung disease ICD-10-CM: J98.4  ICD-9-CM: 518.89  4/16/2021 Unknown        GERD (gastroesophageal reflux disease) ICD-10-CM: K21.9  ICD-9-CM: 530.81  Unknown Yes        Ischemic cardiomyopathy ICD-10-CM: I25.5  ICD-9-CM: 414.8  6/30/2017 Yes    Overview Signed 3/19/2020 10:22 AM by Laura Ibrahim MD     Last Assessment & Plan:   He has no symptoms of volume overload. We will continue his current regimen. Follow-up in 6 months with an echocardiogram prior to his return. Systolic CHF, chronic (HCC) ICD-10-CM: I50.22  ICD-9-CM: 428.22, 428.0  11/15/2011 Yes              PLAN:     --Remains on 2L O2 NC  --Sputum AFB x 3, blood cultures pending  --PPD placed and pending  --Aspergillus and quantiferon pending  --On vancomycin/zosyn day 3  --Continue Symbicort and Spiriva inhalers   --Once TB excluded plans to scheudle OP Navigation bronchoscopy with biopsy and staging EBUS  --Continue airborne precautions   --GI/DVT prophylaxis  --Full Code        Paty Anderson NP   Lungs: Few rhonchi  Heart:  RRR with no Murmur/Rubs/Gallops    Additional Comments:  Waiting on results of cultures and afb smears, continue antibx    I have spoken with and examined the patient. I agree with the above assessment and plan as documented.     Eliazar Rey MD

## 2021-04-19 NOTE — PROGRESS NOTES
Pt is resting in bed at this time. Pt is on [facility]. Pt has no s/sx of acute distress at this time. Pt has safety measures in place. Pt has call light within reach. Will prepare report for oncoming nurse.

## 2021-04-19 NOTE — PROGRESS NOTES
[facility]ist Note     Admit Date:  2021  5:37 PM   Name:  Aayush Mcarthur   Age:  72 y.o.  :  1950   MRN:  [id number]   PCP:  Jacqueline García MD  Treatment Team: Attending Provider: Zahra Phan MD; Consulting Provider: Zulma Murray MD; Primary Nurse: Blessing Vasquez RN    HPI/Subjective:     Patient is a 72 y.o. male with medical h/o history of chronic systolic heart failure, status post AICD, COPD, hypertensions, CAD, status post CABG, hyperlipidemia presented to the ED with worsening shortness of breath and cough. Chest CT scan showed right upper lobe consolidation with a 5 cm area of superimposed cavitation. Patient has received Covid vaccine approximately 1 week ago. He was admitted with severe sepsis secondary to suspected bacterial pneumonia with elevated procalcitonin. He was started on vancomycin and Zosyn. Tuberculosis and primary malignancy with cavitation in differential.  Pulmonology consulted. : Patient is seen at the bedside. Reports he is feeling okay. Denies any chest pain, palpitation, nausea, vomiting or abdominal pain. On 2 L nasal cannula. Tuberculosis work-up pending. Assessment and Plan:     Severe sepsis secondary to suspected bacterial pneumonia:  Leukocytosis improving  CT scan with right upper lobe consolidation with a 5 cm area of superimposed cavitation.   Tuberculosis and primary malignancy with cavitation and differential  Follow-up QuantiFERON, Aspergillus Ag, PPD, AFB x 3  Pulmonology consulted and recommend once excluded tuberculosis plan to schedule OP navigational bronchoscopy with biopsy and staging EBUS  Continue vancomycin and Zosyn  Blood cultures daily negative  Continue airborne precautions for now    CAD/hypertension/hyperlipidemia/chronic systolic heart failure/post AICD:  Stable  Continue home meds aspirin, atorvastatin, carvedilol, ezetimibe, hydrochlorothiazide, lisinopril    COPD:  Continue nebs treatment  Continue Symbicort and Spiriva    Discharge planning: To be determined    DVT ppx ordered  Code status:  Full  Estimated LOS:  Greater than 2 midnights  Risk:  high    Hospital Problems as of 4/19/2021 Date Reviewed: 4/19/2021          Codes Class Noted - Resolved POA    * (Principal) Cavitary lung disease ICD-10-CM: J98.4  ICD-9-CM: 518.89  4/16/2021 - Present Unknown        GERD (gastroesophageal reflux disease) ICD-10-CM: K21.9  ICD-9-CM: 530.81  Unknown - Present Yes        Ischemic cardiomyopathy ICD-10-CM: I25.5  ICD-9-CM: 414.8  6/30/2017 - Present Yes    Overview Signed 3/19/2020 10:22 AM by Castro Oliver MD     Last Assessment & Plan:   He has no symptoms of volume overload. We will continue his current regimen. Follow-up in 6 months with an echocardiogram prior to his return. Systolic CHF, chronic (HCC) ICD-10-CM: I50.22  ICD-9-CM: 428.22, 428.0  11/15/2011 - Present Yes                10 systems reviewed and negative except as noted in HPI.   Past Medical History:   Diagnosis Date    CAD (coronary artery disease)     CABG 2001, MI 5-2001    Callus of foot     history of removal    Chronic obstructive pulmonary disease ([facility] [address]     Coronary atherosclerosis of native coronary vessel 12/28/2015    GERD (gastroesophageal reflux disease)     History of dental examination 01/01/2017    History of heart failure     History of palpitations     Hypertension     OA (osteoarthritis)     Pacemaker     S/P CABG (coronary artery bypass graft)     S/P coronary artery bypass graft x 3     Ventricular arrhythmia       Past Surgical History:   Procedure Laterality Date    HX CATARACT REMOVAL      2-2017 RIGHT EYE AND 3-2017 LEFT EYE    HX HEENT      tooth extraction    HX ORTHOPAEDIC Right     foot callus    HX PACEMAKER      ICD    [place] CARDIAC SURG PROCEDURE UNLIST      cabg x3     VASCULAR SURGERY PROCEDURE UNLIST        Allergies   Allergen Reactions    Ventolin [Albuterol Sulfate] Other (comments)     I used it once and woke up in the kitchen floor after my defibrillator went off.  Amiodarone Other (comments)     STUMBLES     Inspra [Eplerenone] Hives    Niaspan [Niacin] Hives    Nitroglycerin Other (comments)     SEVERE HYPOTENSION      Other Medication Anaphylaxis     RASPBERRIES       Social History     Tobacco Use    Smoking status: Former Smoker     Packs/day: 1.50     Years: 30.00     Pack years: 45.00     Types: Cigarettes     Quit date: 4/15/2001     Years since quittin.0    Smokeless tobacco: Never Used   Substance Use Topics    Alcohol use: Yes     Frequency: 2-4 times a month     Drinks per session: 3 or 4     Binge frequency: Never     Comment: social, 3 or less per week      Family History   Problem Relation Age of Onset    Hypertension Father     Heart Disease Brother     Hypertension Brother     No Known Problems Mother     Coronary Artery Disease Other         family hx    No Known Problems Maternal Grandmother     No Known Problems Maternal Grandfather     No Known Problems Paternal Grandmother     No Known Problems Paternal Grandfather       Family history reviewed and noncontributory. Immunization History   Administered Date(s) Administered    Influenza Vaccine 2014, 10/01/2015, 10/01/2016, 2018    Influenza Vaccine (Tri) Adjuvanted (>65 Yrs FLUAD TRI 31763) 2019    Influenza Vaccine Split 2012    Pneumococcal Polysaccharide (PPSV-23) 2016    Pneumococcal Vaccine (Unspecified Type) 2014    TB Skin Test (PPD) Intradermal 2021     PTA Medications:  Prior to Admission Medications   Prescriptions Last Dose Informant Patient Reported? Taking? B.infantis-B.ani-B.long-B.bifi (Probiotic 4X) 10-15 mg TbEC   Yes No   Sig: Take  by mouth. aspirin delayed-release 81 mg tablet   Yes No   Sig: Take 81 mg by mouth daily.    bisacodyL (Dulcolax, bisacodyl,) 5 mg EC tablet   Yes No   Sig: Take 5 mg by mouth daily as needed for Constipation. carvediloL (COREG) 12.5 mg tablet   No No   Sig: Take 1 Tab by mouth two (2) times a day. cholecalciferol (Vitamin D3) 25 mcg (1,000 unit) cap   Yes No   Sig: Take  by mouth daily. ezetimibe (ZETIA) 10 mg tablet   No No   Sig: TAKE 1 TABLET DAILY   fluticasone propion-salmeteroL (Advair Diskus) 250-50 mcg/dose diskus inhaler   No No   Sig: USE 1 INHALATION TWICE A DAY IN THE MORNING AND EVENING APPROXIMATELY 12 HOURS APART   fluticasone propionate (FLONASE) 50 mcg/actuation nasal spray   No No   Si Sprays by Both Nostrils route daily. hydroCHLOROthiazide (HYDRODIURIL) 25 mg tablet   No No   Sig: Take 1 Tab by mouth daily. lisinopriL (PRINIVIL, ZESTRIL) 5 mg tablet   No No   Sig: Take 1 Tab by mouth daily. mexiletine (MEXITIL) 200 mg capsule   No No   Sig: Take 1 Cap by mouth two (2) times a day. multivitamin (ONE A DAY) tablet   Yes No   Sig: Take 1 Tab by mouth daily. nitroglycerin (NITROSTAT) 0.4 mg SL tablet   Yes No   Sig: by SubLINGual route every five (5) minutes as needed. pantoprazole (PROTONIX) 40 mg tablet   No No   Sig: TAKE 1 TABLET DAILY   ranolazine ER (Ranexa) 500 mg SR tablet   No No   Sig: Take 1 Tab by mouth two (2) times a day. simvastatin (ZOCOR) 40 mg tablet   No No   Sig: TAKE 1 TABLET NIGHTLY   sotaloL (BETAPACE) 120 mg tablet   No No   Sig: Take 1 Tab by mouth two (2) times a day.    tiotropium (Spiriva with HandiHaler) 18 mcg inhalation capsule   No No   Sig: INHALE THE CONTENTS OF 1 CAPSULE DAILY      Facility-Administered Medications: None       Objective:     Patient Vitals for the past 24 hrs:   Temp Pulse Resp BP SpO2   21 1153 97.7 °F (36.5 °C) 83 20 123/72 93 %   21 0833 97.6 °F (36.4 °C) 90 18 110/69 92 %   21 0729     95 %   21 0408 98 °F (36.7 °C) 72 18 122/68 96 %   21 2303 97.9 °F (36.6 °C) 75 18 121/70 95 %   21 1958 97.8 °F (36.6 °C) 74 18 118/68 93 %   21 1559 97.6 °F (36.4 °C) 82 18 130/63 93 %     Oxygen Therapy  O2 Sat (%): 93 % (04/19/21 1153)  Pulse via Oximetry: 82 beats per minute (04/19/21 0729)  O2 Device: Nasal cannula (04/19/21 0729)  O2 Flow Rate (L/min): 2 l/min (04/19/21 0729)    Estimated body mass index is 26.55 kg/m² as calculated from the following:    Height as of this encounter: 5' 9\" (1.753 m). Weight as of this encounter: 81.6 kg (179 lb 12.8 oz). Intake/Output Summary (Last 24 hours) at 4/19/2021 1425  Last data filed at 4/19/2021 1351  Gross per 24 hour   Intake 840 ml   Output 800 ml   Net 40 ml       *Note that automatically entered I/Os may not be accurate; dependent on patient compliance with collection and accurate  by assistants. Physical Exam:  General:    Well nourished. Alert. Eyes:   Normal sclerae. Extraocular movements intact. HENT:  Normocephalic, atraumatic. Moist mucous membranes  CV:   RRR. No m/r/g. Lungs:  Diminished breath sounds to right upper lobe  Abdomen: Soft, nontender, nondistended. Extremities: Warm and dry. No cyanosis or edema. Neurologic: CN II-XII grossly intact. Sensation intact. Skin:     No rashes or jaundice. Normal coloration  Psych:  Normal mood and affect. I reviewed the labs, imaging, EKGs, telemetry, and other studies done this admission. Data Reviewed:   Recent Results (from the past 24 hour(s))   METABOLIC PANEL, COMPREHENSIVE    Collection Time: 04/19/21  6:47 AM   Result Value Ref Range    Sodium 130 (L) 136 - 145 mmol/L    Potassium 3.4 (L) 3.5 - 5.1 mmol/L    Chloride 96 (L) 98 - 107 mmol/L    CO2 25 21 - 32 mmol/L    Anion gap 9 7 - 16 mmol/L    Glucose 88 65 - 100 mg/dL    BUN 14 8 - 23 MG/DL    Creatinine 0.76 (L) 0.8 - 1.5 MG/DL    GFR est AA >60 >60 ml/min/1.73m2    GFR est non-AA >60 >60 ml/min/1.73m2    Calcium 8.6 8.3 - 10.4 MG/DL    Bilirubin, total 1.3 (H) 0.2 - 1.1 MG/DL    ALT (SGPT) 227 (H) 12 - 65 U/L    AST (SGOT) 222 (H) 15 - 37 U/L    Alk.  phosphatase 231 (H) 50 - 136 U/L    Protein, total 7.5 6.3 - 8.2 g/dL    Albumin 1.4 (L) 3.2 - 4.6 g/dL    Globulin 6.1 (H) 2.3 - 3.5 g/dL    A-G Ratio 0.2 (L) 1.2 - 3.5     CBC WITH AUTOMATED DIFF    Collection Time: 04/19/21  6:47 AM   Result Value Ref Range    WBC 18.2 (H) 4.3 - 11.1 K/uL    RBC 3.31 (L) 4.23 - 5.6 M/uL    HGB 9.8 (L) 13.6 - 17.2 g/dL    HCT 28.7 (L) 41.1 - 50.3 %    MCV 86.7 79.6 - 97.8 FL    MCH 29.6 26.1 - 32.9 PG    MCHC 34.1 31.4 - 35.0 g/dL    RDW 13.9 11.9 - 14.6 %    PLATELET 515 (H) 456 - 450 K/uL    MPV 8.8 (L) 9.4 - 12.3 FL    ABSOLUTE NRBC 0.00 0.0 - 0.2 K/uL    DF AUTOMATED      NEUTROPHILS 88 (H) 43 - 78 %    LYMPHOCYTES 5 (L) 13 - 44 %    MONOCYTES 5 4.0 - 12.0 %    EOSINOPHILS 1 0.5 - 7.8 %    BASOPHILS 0 0.0 - 2.0 %    IMMATURE GRANULOCYTES 1 0.0 - 5.0 %    ABS. NEUTROPHILS 16.1 (H) 1.7 - 8.2 K/UL    ABS. LYMPHOCYTES 0.9 0.5 - 4.6 K/UL    ABS. MONOCYTES 1.0 0.1 - 1.3 K/UL    ABS. EOSINOPHILS 0.1 0.0 - 0.8 K/UL    ABS. BASOPHILS 0.0 0.0 - 0.2 K/UL    ABS. IMM.  GRANS. 0.1 0.0 - 0.5 K/UL   HEPATITIS PANEL, ACUTE    Collection Time: 04/19/21  7:56 AM   Result Value Ref Range    Hepatitis A, IgM NONREACTIVE NR      Hepatitis B core, IgM NONREACTIVE NR      Hep B Surface Ag NONREACTIVE NR      Hepatitis C virus Ab NONREACTIVE NR     PLEASE READ & DOCUMENT PPD TEST IN 24 HRS    Collection Time: 04/19/21 12:04 PM   Result Value Ref Range    PPD Negative Negative    mm Induration 0 0 - 5 mm       All Micro Results     Procedure Component Value Units Date/Time    CULTURE, BLOOD [[id number]] Collected: 04/17/21 0832    Order Status: Completed Specimen: Blood Updated: 04/19/21 1058     Special Requests: --        LEFT  HAND       Culture result: NO GROWTH 2 DAYS       CULTURE, BLOOD [[id number]] Collected: 04/16/21 1820    Order Status: Completed Specimen: Blood Updated: 04/19/21 1058     Special Requests: --        NO SPECIAL REQUESTS  RIGHT  ARM       Culture result: NO GROWTH 3 DAYS       AFB CULTURE + SMEAR W/RFLX ID FROM CULTURE [[id number]] Collected: 04/18/21 2032    Order Status: Completed Updated: 04/18/21 2316    AFB CULTURE + SMEAR W/RFLX ID FROM CULTURE [[id number]] Collected: 04/18/21 1814    Order Status: Completed Updated: 04/18/21 1852    AFB CULTURE + SMEAR W/RFLX ID FROM CULTURE [[id number]] Collected: 04/18/21 1510    Order Status: Completed Updated: 04/18/21 1617    QUANTIFERON-TB GOLD PLUS [[id number]]     Order Status: Sent Specimen: Blood           Current Facility-Administered Medications   Medication Dose Route Frequency    sodium chloride 3% hypertonic nebulizer soln  4 mL Nebulization BID PRN    nitroglycerin (NITROSTAT) tablet 0.4 mg  0.4 mg SubLINGual PRN    aspirin delayed-release tablet 81 mg  81 mg Oral DAILY    bisacodyL (DULCOLAX) tablet 5 mg  5 mg Oral DAILY PRN    cholecalciferol (VITAMIN D3) (1000 Units /25 mcg) tablet 2,000 Units  2,000 Units Oral DAILY    mexiletine (MEXITIL) capsule 200 mg (Patient Supplied)  200 mg Oral BID    fluticasone propionate (FLONASE) 50 mcg/actuation nasal spray 2 Spray  2 Spray Both Nostrils DAILY    carvediloL (COREG) tablet 12.5 mg  12.5 mg Oral BID    ezetimibe (ZETIA) tablet 10 mg  10 mg Oral DAILY    hydroCHLOROthiazide (HYDRODIURIL) tablet 25 mg  25 mg Oral DAILY    lisinopriL (PRINIVIL, ZESTRIL) tablet 5 mg  5 mg Oral DAILY    pantoprazole (PROTONIX) tablet 40 mg  40 mg Oral ACB    atorvastatin (LIPITOR) tablet 20 mg  20 mg Oral DAILY    sotaloL (BETAPACE) tablet 120 mg  120 mg Oral BID    ranolazine ER (RANEXA) tablet 500 mg  500 mg Oral BID    budesonide-formoteroL (SYMBICORT) 160-4.5 mcg/actuation HFA inhaler 2 Puff  2 Puff Inhalation BID RT    sodium chloride (NS) flush 5-40 mL  5-40 mL IntraVENous Q8H    sodium chloride (NS) flush 5-40 mL  5-40 mL IntraVENous PRN    acetaminophen (TYLENOL) tablet 650 mg  650 mg Oral Q6H PRN    Or    acetaminophen (TYLENOL) suppository 650 mg  650 mg Rectal Q6H PRN    polyethylene glycol (MIRALAX) packet 17 g  17 g Oral DAILY    senna-docusate (PERICOLACE) 8.6-50 mg per tablet 1 Tab  1 Tab Oral BID    magnesium hydroxide (MILK OF MAGNESIA) 400 mg/5 mL oral suspension 30 mL  30 mL Oral DAILY PRN    bisacodyL (DULCOLAX) suppository 10 mg  10 mg Rectal DAILY PRN    enoxaparin (LOVENOX) injection 40 mg  40 mg SubCUTAneous DAILY    tiotropium bromide (SPIRIVA RESPIMAT) 2.5 mcg /actuation  2 Puff Inhalation DAILY    piperacillin-tazobactam (ZOSYN) 3.375 g in 0.9% sodium chloride (MBP/ADV) 100 mL MBP  3.375 g IntraVENous Q8H    vancomycin (VANCOCIN) 1250 mg in  ml infusion  1,250 mg IntraVENous Q12H       Other Studies:  No results found for this visit on 04/16/21. No results found. SARS-CoV-2 Lab Results  \"Novel Coronavirus\" Test: No results found for: COV2NT   \"Emergent Disease\" Test: No results found for: EDPR  \"SARS-COV-2\" Test: No results found for: XGCOVT  Rapid Test: No results found for: COVR            Part of this note was written by using a voice dictation software and the note has been proof read but may still contain some grammatical/other typographical errors.     Signed:  Rishabh Levin MD

## 2021-04-20 PROBLEM — J96.01 ACUTE RESPIRATORY FAILURE WITH HYPOXIA (HCC): Status: ACTIVE | Noted: 2021-04-20

## 2021-04-20 LAB
ANION GAP SERPL CALC-SCNC: 8 MMOL/L (ref 7–16)
BASOPHILS # BLD: 0 K/UL (ref 0–0.2)
BASOPHILS NFR BLD: 0 % (ref 0–2)
BUN SERPL-MCNC: 11 MG/DL (ref 8–23)
CALCIUM SERPL-MCNC: 8.4 MG/DL (ref 8.3–10.4)
CHLORIDE SERPL-SCNC: 98 MMOL/L (ref 98–107)
CO2 SERPL-SCNC: 25 MMOL/L (ref 21–32)
CREAT SERPL-MCNC: 0.73 MG/DL (ref 0.8–1.5)
DIFFERENTIAL METHOD BLD: ABNORMAL
EOSINOPHIL # BLD: 0.1 K/UL (ref 0–0.8)
EOSINOPHIL NFR BLD: 1 % (ref 0.5–7.8)
ERYTHROCYTE [DISTWIDTH] IN BLOOD BY AUTOMATED COUNT: 13.6 % (ref 11.9–14.6)
GLUCOSE SERPL-MCNC: 120 MG/DL (ref 65–100)
HCT VFR BLD AUTO: 28.5 % (ref 41.1–50.3)
HGB BLD-MCNC: 9.6 G/DL (ref 13.6–17.2)
IMM GRANULOCYTES # BLD AUTO: 0.1 K/UL (ref 0–0.5)
IMM GRANULOCYTES NFR BLD AUTO: 1 % (ref 0–5)
LYMPHOCYTES # BLD: 1 K/UL (ref 0.5–4.6)
LYMPHOCYTES NFR BLD: 6 % (ref 13–44)
MCH RBC QN AUTO: 29.3 PG (ref 26.1–32.9)
MCHC RBC AUTO-ENTMCNC: 33.7 G/DL (ref 31.4–35)
MCV RBC AUTO: 86.9 FL (ref 79.6–97.8)
MONOCYTES # BLD: 0.8 K/UL (ref 0.1–1.3)
MONOCYTES NFR BLD: 5 % (ref 4–12)
NEUTS SEG # BLD: 13.4 K/UL (ref 1.7–8.2)
NEUTS SEG NFR BLD: 87 % (ref 43–78)
NRBC # BLD: 0 K/UL (ref 0–0.2)
PLATELET # BLD AUTO: 663 K/UL (ref 150–450)
PMV BLD AUTO: 8.6 FL (ref 9.4–12.3)
POTASSIUM SERPL-SCNC: 3.3 MMOL/L (ref 3.5–5.1)
RBC # BLD AUTO: 3.28 M/UL (ref 4.23–5.6)
SODIUM SERPL-SCNC: 131 MMOL/L (ref 138–145)
VANCOMYCIN TROUGH SERPL-MCNC: 17.9 UG/ML (ref 5–20)
WBC # BLD AUTO: 15.4 K/UL (ref 4.3–11.1)

## 2021-04-20 PROCEDURE — 99232 SBSQ HOSP IP/OBS MODERATE 35: CPT | Performed by: INTERNAL MEDICINE

## 2021-04-20 PROCEDURE — 65270000029 HC RM PRIVATE

## 2021-04-20 PROCEDURE — 74011000258 HC RX REV CODE- 258: Performed by: HOSPITALIST

## 2021-04-20 PROCEDURE — 74011250636 HC RX REV CODE- 250/636: Performed by: HOSPITALIST

## 2021-04-20 PROCEDURE — 80048 BASIC METABOLIC PNL TOTAL CA: CPT

## 2021-04-20 PROCEDURE — 77010033678 HC OXYGEN DAILY

## 2021-04-20 PROCEDURE — 36415 COLL VENOUS BLD VENIPUNCTURE: CPT

## 2021-04-20 PROCEDURE — 80202 ASSAY OF VANCOMYCIN: CPT

## 2021-04-20 PROCEDURE — 94760 N-INVAS EAR/PLS OXIMETRY 1: CPT

## 2021-04-20 PROCEDURE — 74011250637 HC RX REV CODE- 250/637: Performed by: HOSPITALIST

## 2021-04-20 PROCEDURE — 85025 COMPLETE CBC W/AUTO DIFF WBC: CPT

## 2021-04-20 PROCEDURE — 94640 AIRWAY INHALATION TREATMENT: CPT

## 2021-04-20 RX ADMIN — CHOLECALCIFEROL TAB 25 MCG (1000 UNIT) 2000 UNITS: 25 TAB at 08:38

## 2021-04-20 RX ADMIN — PIPERACILLIN SODIUM AND TAZOBACTAM SODIUM 3.38 G: 3; .375 INJECTION, POWDER, LYOPHILIZED, FOR SOLUTION INTRAVENOUS at 03:56

## 2021-04-20 RX ADMIN — Medication 10 ML: at 12:20

## 2021-04-20 RX ADMIN — MEXILETINE HYDROCHLORIDE 200 MG: 200 CAPSULE ORAL at 08:39

## 2021-04-20 RX ADMIN — RANOLAZINE 500 MG: 500 TABLET, FILM COATED, EXTENDED RELEASE ORAL at 16:44

## 2021-04-20 RX ADMIN — SOTALOL HYDROCHLORIDE 120 MG: 80 TABLET ORAL at 16:44

## 2021-04-20 RX ADMIN — PANTOPRAZOLE SODIUM 40 MG: 40 TABLET, DELAYED RELEASE ORAL at 06:30

## 2021-04-20 RX ADMIN — TIOTROPIUM BROMIDE INHALATION SPRAY 2 PUFF: 3.12 SPRAY, METERED RESPIRATORY (INHALATION) at 09:00

## 2021-04-20 RX ADMIN — PIPERACILLIN SODIUM AND TAZOBACTAM SODIUM 3.38 G: 3; .375 INJECTION, POWDER, LYOPHILIZED, FOR SOLUTION INTRAVENOUS at 10:24

## 2021-04-20 RX ADMIN — ASPIRIN 81 MG: 81 TABLET ORAL at 08:39

## 2021-04-20 RX ADMIN — ATORVASTATIN CALCIUM 20 MG: 20 TABLET, FILM COATED ORAL at 08:39

## 2021-04-20 RX ADMIN — MEXILETINE HYDROCHLORIDE 200 MG: 200 CAPSULE ORAL at 20:00

## 2021-04-20 RX ADMIN — BUDESONIDE AND FORMOTEROL FUMARATE DIHYDRATE 2 PUFF: 160; 4.5 AEROSOL RESPIRATORY (INHALATION) at 20:16

## 2021-04-20 RX ADMIN — Medication 10 ML: at 21:49

## 2021-04-20 RX ADMIN — ENOXAPARIN SODIUM 40 MG: 40 INJECTION SUBCUTANEOUS at 08:38

## 2021-04-20 RX ADMIN — Medication 10 ML: at 06:00

## 2021-04-20 RX ADMIN — HYDROCHLOROTHIAZIDE 25 MG: 25 TABLET ORAL at 08:38

## 2021-04-20 RX ADMIN — FLUTICASONE PROPIONATE 2 SPRAY: 50 SPRAY, METERED NASAL at 08:39

## 2021-04-20 RX ADMIN — EZETIMIBE 10 MG: 10 TABLET ORAL at 08:39

## 2021-04-20 RX ADMIN — RANOLAZINE 500 MG: 500 TABLET, FILM COATED, EXTENDED RELEASE ORAL at 08:38

## 2021-04-20 RX ADMIN — VANCOMYCIN HYDROCHLORIDE 1250 MG: 10 INJECTION, POWDER, LYOPHILIZED, FOR SOLUTION INTRAVENOUS at 08:38

## 2021-04-20 RX ADMIN — PIPERACILLIN SODIUM AND TAZOBACTAM SODIUM 3.38 G: 3; .375 INJECTION, POWDER, LYOPHILIZED, FOR SOLUTION INTRAVENOUS at 21:49

## 2021-04-20 RX ADMIN — CARVEDILOL 12.5 MG: 12.5 TABLET, FILM COATED ORAL at 08:39

## 2021-04-20 RX ADMIN — SOTALOL HYDROCHLORIDE 120 MG: 80 TABLET ORAL at 08:38

## 2021-04-20 RX ADMIN — LISINOPRIL 5 MG: 5 TABLET ORAL at 08:39

## 2021-04-20 RX ADMIN — SENNOSIDES AND DOCUSATE SODIUM 1 TABLET: 8.6; 5 TABLET ORAL at 21:49

## 2021-04-20 NOTE — PROGRESS NOTES
Gordon Génesis  Admission Date: 4/16/2021             Daily Progress Note: 4/20/2021    The patient's chart is reviewed and the patient is discussed with the staff. Pt is a 71 yo  male with a history of GOLD stage II COPD, chronic systolic heart failure, s/p AICD, HTN, former smoker (quit 2001), balance issues, CAD, s/p CABG, and hyperlipidmia. Pt presented to the ER on 4/16/21 with shortness of breath and cough 1 week after receiving COVID vaccine. Pt had a chest CT scan in the ER which revealed RUL consolidation with a 5cm area of superimposed cavitation, R hilar and mediastinal lymphadenopathy, and calcified pleural plaques on the L. He was started on Vanc and Zosyn in the ER and admitted by the hospitalist service for sepsis with PNA. Pt was placed on airborne precautions. His Quantiferon and aspergillus tests are pending. His AFB obtained 4/18 and pending. Subjective:     Pt is sitting up in bed on 2L O2. He is coughing but not coughing up anything. He admits to dyspnea, especially with exertion. He denies wheezing. He did cough up a little bit of mucous a few days ago.      Current Facility-Administered Medications   Medication Dose Route Frequency    Vancomycin Trough Reminder   Other ONCE    sodium chloride 3% hypertonic nebulizer soln  4 mL Nebulization BID PRN    nitroglycerin (NITROSTAT) tablet 0.4 mg  0.4 mg SubLINGual PRN    aspirin delayed-release tablet 81 mg  81 mg Oral DAILY    bisacodyL (DULCOLAX) tablet 5 mg  5 mg Oral DAILY PRN    cholecalciferol (VITAMIN D3) (1000 Units /25 mcg) tablet 2,000 Units  2,000 Units Oral DAILY    mexiletine (MEXITIL) capsule 200 mg (Patient Supplied)  200 mg Oral BID    fluticasone propionate (FLONASE) 50 mcg/actuation nasal spray 2 Spray  2 Spray Both Nostrils DAILY    carvediloL (COREG) tablet 12.5 mg  12.5 mg Oral BID    ezetimibe (ZETIA) tablet 10 mg  10 mg Oral DAILY    hydroCHLOROthiazide (HYDRODIURIL) tablet 25 mg  25 mg Oral DAILY    lisinopriL (PRINIVIL, ZESTRIL) tablet 5 mg  5 mg Oral DAILY    pantoprazole (PROTONIX) tablet 40 mg  40 mg Oral ACB    atorvastatin (LIPITOR) tablet 20 mg  20 mg Oral DAILY    sotaloL (BETAPACE) tablet 120 mg  120 mg Oral BID    ranolazine ER (RANEXA) tablet 500 mg  500 mg Oral BID    budesonide-formoteroL (SYMBICORT) 160-4.5 mcg/actuation HFA inhaler 2 Puff  2 Puff Inhalation BID RT    sodium chloride (NS) flush 5-40 mL  5-40 mL IntraVENous Q8H    sodium chloride (NS) flush 5-40 mL  5-40 mL IntraVENous PRN    acetaminophen (TYLENOL) tablet 650 mg  650 mg Oral Q6H PRN    Or    acetaminophen (TYLENOL) suppository 650 mg  650 mg Rectal Q6H PRN    polyethylene glycol (MIRALAX) packet 17 g  17 g Oral DAILY    senna-docusate (PERICOLACE) 8.6-50 mg per tablet 1 Tab  1 Tab Oral BID    magnesium hydroxide (MILK OF MAGNESIA) 400 mg/5 mL oral suspension 30 mL  30 mL Oral DAILY PRN    bisacodyL (DULCOLAX) suppository 10 mg  10 mg Rectal DAILY PRN    enoxaparin (LOVENOX) injection 40 mg  40 mg SubCUTAneous DAILY    tiotropium bromide (SPIRIVA RESPIMAT) 2.5 mcg /actuation  2 Puff Inhalation DAILY    piperacillin-tazobactam (ZOSYN) 3.375 g in 0.9% sodium chloride (MBP/ADV) 100 mL MBP  3.375 g IntraVENous Q8H    vancomycin (VANCOCIN) 1250 mg in  ml infusion  1,250 mg IntraVENous Q12H       Review of Systems  +cough  +dyspnea  Constitutional: negative for fever, chills, sweats  Cardiovascular: negative for chest pain, palpitations, syncope, edema  Gastrointestinal:  negative for dysphagia, reflux, vomiting, diarrhea, abdominal pain, or melena  Neurologic:  negative for focal weakness, numbness, headache    Objective:     Vitals:    04/20/21 0357 04/20/21 0747 04/20/21 0843 04/20/21 1130   BP: 124/73 126/68  131/71   Pulse: 82 81  80   Resp: 18 20  20   Temp: 97.9 °F (36.6 °C) 98 °F (36.7 °C)  98 °F (36.7 °C)   SpO2: 91% 92% 94% 95%   Weight: 180 lb 12.8 oz (82 kg)      Height: Intake/Output Summary (Last 24 hours) at 4/20/2021 1156  Last data filed at 4/20/2021 0748  Gross per 24 hour   Intake 480 ml   Output 775 ml   Net -295 ml       Physical Exam:   Constitution:  the patient is well developed and in no acute distress, on 2L O2  EENMT:  Sclera clear, pupils equal, oral mucosa moist  Respiratory: few coarse crackles  Cardiovascular:  RRR without M,G,R  Gastrointestinal: soft and non-tender; with positive bowel sounds. Musculoskeletal: warm without cyanosis. There is no lower extremity edema. Skin:  no jaundice or rashes  Neurologic: no gross neuro deficits     Psychiatric:  alert and oriented x 3    Chest CT:       LAB  Recent Labs     04/17/21  2236   GLUCPOC 101*      Recent Labs     04/20/21  0944 04/19/21  0647 04/18/21  0740   WBC 15.4* 18.2* 21.6*   HGB 9.6* 9.8* 9.4*   HCT 28.5* 28.7* 27.9*   * 690* 656*     Recent Labs     04/20/21  0944 04/19/21  0647 04/18/21  0740   * 130* 132*   K 3.3* 3.4* 3.6   CL 98 96* 99   CO2 25 25 26   * 88 91   BUN 11 14 21   CREA 0.73* 0.76* 0.77*   CA 8.4 8.6 8.4   ALB  --  1.4* 1.4*   TBILI  --  1.3* 1.6*   ALT  --  227* 267*     No results for input(s): PH, PCO2, PO2, HCO3, PHI, PCO2I, PO2I, HCO3I in the last 72 hours. No results for input(s): LCAD, LAC in the last 72 hours. Assessment:  (Medical Decision Making)     Hospital Problems  Date Reviewed: 4/20/2021          Codes Class Noted POA    Acute respiratory failure with hypoxia [facility]) ICD-10-CM: J96.01  ICD-9-CM: 518.81  4/20/2021 Unknown    Wean O2 as tolerated, not on O2 at home.      * (Principal) Cavitary lung disease ICD-10-CM: J98.4  ICD-9-CM: 518.89  4/16/2021 Unknown    On vanc/zosyn     GERD (gastroesophageal reflux disease) ICD-10-CM: K21.9  ICD-9-CM: 530.81  Unknown Yes    Chronic     Ischemic cardiomyopathy ICD-10-CM: I25.5  ICD-9-CM: 414.8  6/30/2017 Yes    Overview Signed 3/19/2020 10:22 AM by Laura Ibrahim MD     Last Assessment & Plan: He has no symptoms of volume overload. We will continue his current regimen. Follow-up in 6 months with an echocardiogram prior to his return. chronic    Systolic CHF, chronic (HCC) ICD-10-CM: I50.22  ICD-9-CM: 428.22, 428.0  11/15/2011 Yes              Plan:  (Medical Decision Making)     --wean O2 as tolerated  --AFB cx x 3 pending  --BC pending  --aspergillus and quantiferon gold pending  --on vanc/zosyn day 4  --continue symbicort/spiriva  --continue airborne precautions for now  --continue PPI/Lovenox  --add ocean nasal spray for dry mouth  --once AFB smears negative, could schedule for outpt navgational bronchoscopy with EBUS  --pt is a FULL CODE    More than 50% of the time documented was spent in face-to-face contact with the patient and in the care of the patient on the floor/unit where the patient is located. COLEEN Abebe  Lungs:  clear  Heart:  RRR with no Murmur/Rubs/Gallops    Additional Comments:  Still waiting on results of afb-will check with lab tomorrow    I have spoken with and examined the patient. I agree with the above assessment and plan as documented.     Matt Del Angel MD

## 2021-04-20 NOTE — PROGRESS NOTES
Pt resting in bed comfortably at this time, alert and oriented times 4. No distress noted, respirations even and unlabored on 2 L NC. Pt denies pain at this time. Urinal at the bedside. Pt instructed to call for assistance if needed, call light in place, will continue to monitor.

## 2021-04-20 NOTE — PROGRESS NOTES
Report received from [facility]. Patient resting quietly in bed. Airborne precautions in place for TB rule out. Respirations present, even and unlabored on 2L NC. Bed low and locked, safety measures in place. No signs of distress, no needs expressed. Call light within reach, encouraged patient to call with any needs. Will continue to monitor.

## 2021-04-20 NOTE — PROGRESS NOTES
No acute events overnight. Patient resting quietly in bed. Respirations present, even and unlabored on 2L NC. Bed low and locked, safety measures in place. No signs of distress, no needs expressed. Call light within reach, encouraged patient to call with any needs. Preparing to give report to oncoming RN.

## 2021-04-20 NOTE — PROGRESS NOTES
Pt resting in bed comfortably at this time, alert and oriented times 4. No distress noted, respirations even and unlabored on 2 L NC. Pt denies pain at this time. Pt instructed to call for assistance if needed, call light in place, will continue to monitor. Will prepare for bedside shift report.

## 2021-04-20 NOTE — PROGRESS NOTES
[facility] Hospitalist Note     Admit Date:  2021  5:37 PM   Name:  Yasmeen Cerrato   Age:  72 y.o.  :  1950   MRN:  [id number]   PCP:  Carleen Bullock MD  Treatment Team: Attending Provider: Gloria Sheridan MD; Consulting Provider: Kathryn Mandujano MD; Primary Nurse: Lula Fraga RN; Physical Therapist: Sally Marx; Care Manager: Rita Jaffe RN    HPI/Subjective:     Patient is a 72 y.o. male with medical h/o history of chronic systolic heart failure, status post AICD, COPD, hypertensions, CAD, status post CABG, hyperlipidemia presented to the ED with worsening shortness of breath and cough. Chest CT scan showed right upper lobe consolidation with a 5 cm area of superimposed cavitation. Patient has received Covid vaccine approximately 1 week ago. He was admitted with severe sepsis secondary to suspected bacterial pneumonia with elevated procalcitonin. He was started on vancomycin and Zosyn. Tuberculosis and primary malignancy with cavitation in differential.  Pulmonology consulted. : Patient is seen at the bedside. Sitting up in bed. No new complaint. Endorses exertional dyspnea. Denies chest pain, palpitation, nausea, vomiting or abdominal pain. Pending tuberculosis work-up result    Assessment and Plan:     Severe sepsis secondary to suspected bacterial pneumonia:  Leukocytosis improving  CT scan with right upper lobe consolidation with a 5 cm area of superimposed cavitation.   Tuberculosis and primary malignancy with cavitation and differential  Follow-up QuantiFERON, Aspergillus Ag, AFB x 3  Pulmonology consulted and recommend once excluded tuberculosis plan to schedule OP navigational bronchoscopy with biopsy and staging EBUS  Continue vancomycin and Zosyn  Blood cultures till date daily negative  Continue airborne precautions for now    CAD/hypertension/hyperlipidemia/chronic systolic heart failure/post AICD:  Stable  Continue home meds aspirin, atorvastatin, carvedilol, ezetimibe, hydrochlorothiazide, lisinopril    COPD:  Continue nebs treatment  Continue Symbicort and Spiriva    Discharge planning: Pending tuberculosis work-up, once AFB smears negative, could reschedule for outpatient navigational bronchoscopy with EBUS    DVT ppx ordered  Code status:  Full  Estimated LOS:  Greater than 2 midnights  Risk:  high    Hospital Problems as of 4/20/2021 Date Reviewed: 4/20/2021          Codes Class Noted - Resolved POA    Acute respiratory failure with hypoxia ([facility] [address] ICD-10-CM: J96.01  ICD-9-CM: 518.81  4/20/2021 - Present Unknown        * (Principal) Cavitary lung disease ICD-10-CM: J98.4  ICD-9-CM: 518.89  4/16/2021 - Present Unknown        GERD (gastroesophageal reflux disease) ICD-10-CM: K21.9  ICD-9-CM: 530.81  Unknown - Present Yes        Ischemic cardiomyopathy ICD-10-CM: I25.5  ICD-9-CM: 414.8  6/30/2017 - Present Yes    Overview Signed 3/19/2020 10:22 AM by Thom Johnston MD     Last Assessment & Plan:   He has no symptoms of volume overload. We will continue his current regimen. Follow-up in 6 months with an echocardiogram prior to his return. Systolic CHF, chronic (HCC) ICD-10-CM: I50.22  ICD-9-CM: 428.22, 428.0  11/15/2011 - Present Yes                10 systems reviewed and negative except as noted in HPI.   Past Medical History:   Diagnosis Date    CAD (coronary artery disease)     CABG 2001, MI 5-2001    Callus of foot     history of removal    Chronic obstructive pulmonary disease ([facility] [address]     Coronary atherosclerosis of native coronary vessel 12/28/2015    GERD (gastroesophageal reflux disease)     History of dental examination 01/01/2017    History of heart failure     History of palpitations     Hypertension     OA (osteoarthritis)     Pacemaker     S/P CABG (coronary artery bypass graft)     S/P coronary artery bypass graft x 3     Ventricular arrhythmia       Past Surgical History:   Procedure Laterality Date    HX CATARACT REMOVAL      2- RIGHT EYE AND 3-2017 LEFT EYE    HX HEENT      tooth extraction    HX ORTHOPAEDIC Right     foot callus    HX PACEMAKER      ICD    CT CARDIAC SURG PROCEDURE UNLIST      cabg x3     VASCULAR SURGERY PROCEDURE UNLIST        Allergies   Allergen Reactions    Ventolin [Albuterol Sulfate] Other (comments)     I used it once and woke up in the kitchen floor after my defibrillator went off.  Amiodarone Other (comments)     STUMBLES     Inspra [Eplerenone] Hives    Niaspan [Niacin] Hives    Nitroglycerin Other (comments)     SEVERE HYPOTENSION      Other Medication Anaphylaxis     RASPBERRIES       Social History     Tobacco Use    Smoking status: Former Smoker     Packs/day: 1.50     Years: 30.00     Pack years: 45.00     Types: Cigarettes     Quit date: 4/15/2001     Years since quittin.0    Smokeless tobacco: Never Used   Substance Use Topics    Alcohol use: Yes     Frequency: 2-4 times a month     Drinks per session: 3 or 4     Binge frequency: Never     Comment: social, 3 or less per week      Family History   Problem Relation Age of Onset    Hypertension Father     Heart Disease Brother     Hypertension Brother     No Known Problems Mother     Coronary Artery Disease Other         family hx    No Known Problems Maternal Grandmother     No Known Problems Maternal Grandfather     No Known Problems Paternal Grandmother     No Known Problems Paternal Grandfather       Family history reviewed and noncontributory.   Immunization History   Administered Date(s) Administered    Influenza Vaccine 2014, 10/01/2015, 10/01/2016, 2018    Influenza Vaccine (Tri) Adjuvanted (>65 Yrs FLUAD TRI 61681) 2019    Influenza Vaccine Split 2012    Pneumococcal Polysaccharide (PPSV-23) 2016    Pneumococcal Vaccine (Unspecified Type) 2014    TB Skin Test (PPD) Intradermal 2021     PTA Medications:  Prior to Admission Medications   Prescriptions Last Dose Informant Patient Reported? Taking? B.infantis-B.ani-B.long-B.bifi (Probiotic 4X) 10-15 mg TbEC   Yes No   Sig: Take  by mouth. aspirin delayed-release 81 mg tablet   Yes No   Sig: Take 81 mg by mouth daily. bisacodyL (Dulcolax, bisacodyl,) 5 mg EC tablet   Yes No   Sig: Take 5 mg by mouth daily as needed for Constipation. carvediloL (COREG) 12.5 mg tablet   No No   Sig: Take 1 Tab by mouth two (2) times a day. cholecalciferol (Vitamin D3) 25 mcg (1,000 unit) cap   Yes No   Sig: Take  by mouth daily. ezetimibe (ZETIA) 10 mg tablet   No No   Sig: TAKE 1 TABLET DAILY   fluticasone propion-salmeteroL (Advair Diskus) 250-50 mcg/dose diskus inhaler   No No   Sig: USE 1 INHALATION TWICE A DAY IN THE MORNING AND EVENING APPROXIMATELY 12 HOURS APART   fluticasone propionate (FLONASE) 50 mcg/actuation nasal spray   No No   Si Sprays by Both Nostrils route daily. hydroCHLOROthiazide (HYDRODIURIL) 25 mg tablet   No No   Sig: Take 1 Tab by mouth daily. lisinopriL (PRINIVIL, ZESTRIL) 5 mg tablet   No No   Sig: Take 1 Tab by mouth daily. mexiletine (MEXITIL) 200 mg capsule   No No   Sig: Take 1 Cap by mouth two (2) times a day. multivitamin (ONE A DAY) tablet   Yes No   Sig: Take 1 Tab by mouth daily. nitroglycerin (NITROSTAT) 0.4 mg SL tablet   Yes No   Sig: by SubLINGual route every five (5) minutes as needed. pantoprazole (PROTONIX) 40 mg tablet   No No   Sig: TAKE 1 TABLET DAILY   ranolazine ER (Ranexa) 500 mg SR tablet   No No   Sig: Take 1 Tab by mouth two (2) times a day. simvastatin (ZOCOR) 40 mg tablet   No No   Sig: TAKE 1 TABLET NIGHTLY   sotaloL (BETAPACE) 120 mg tablet   No No   Sig: Take 1 Tab by mouth two (2) times a day.    tiotropium (Spiriva with HandiHaler) 18 mcg inhalation capsule   No No   Sig: INHALE THE CONTENTS OF 1 CAPSULE DAILY      Facility-Administered Medications: None       Objective:     Patient Vitals for the past 24 hrs:   Temp Pulse Resp BP SpO2 04/20/21 1130 98 °F (36.7 °C) 80 20 131/71 95 %   04/20/21 0843     94 %   04/20/21 0747 98 °F (36.7 °C) 81 20 126/68 92 %   04/20/21 0357 97.9 °F (36.6 °C) 82 18 124/73 91 %   04/19/21 2235 97.8 °F (36.6 °C) 78 20 112/67 94 %   04/19/21 2109     94 %   04/19/21 1957 97.6 °F (36.4 °C) 77 20 115/70 93 %   04/19/21 1542 97.4 °F (36.3 °C) 81 20 105/63 91 %     Oxygen Therapy  O2 Sat (%): 95 % (04/20/21 1130)  Pulse via Oximetry: 88 beats per minute (04/19/21 2109)  O2 Device: Nasal cannula (04/20/21 1046)  O2 Flow Rate (L/min): 2 l/min (04/20/21 1046)  FIO2 (%): 28 % (04/20/21 0843)    Estimated body mass index is 26.7 kg/m² as calculated from the following:    Height as of this encounter: 5' 9\" (1.753 m). Weight as of this encounter: 82 kg (180 lb 12.8 oz). Intake/Output Summary (Last 24 hours) at 4/20/2021 1341  Last data filed at 4/20/2021 1322  Gross per 24 hour   Intake 720 ml   Output 675 ml   Net 45 ml       *Note that automatically entered I/Os may not be accurate; dependent on patient compliance with collection and accurate  by assistants. Physical Exam:  General:    Well nourished. Alert. Eyes:   Normal sclerae. Extraocular movements intact. HENT:  Normocephalic, atraumatic. Moist mucous membranes  CV:   RRR. No m/r/g. Lungs:  Diminished breath sounds to right upper lobe  Abdomen: Soft, nontender, nondistended. Extremities: Warm and dry. No cyanosis or edema. Neurologic: CN II-XII grossly intact. Sensation intact. Skin:     No rashes or jaundice. Normal coloration  Psych:  Normal mood and affect. I reviewed the labs, imaging, EKGs, telemetry, and other studies done this admission.   Data Reviewed:   Recent Results (from the past 24 hour(s))   CBC WITH AUTOMATED DIFF    Collection Time: 04/20/21  9:44 AM   Result Value Ref Range    WBC 15.4 (H) 4.3 - 11.1 K/uL    RBC 3.28 (L) 4.23 - 5.6 M/uL    HGB 9.6 (L) 13.6 - 17.2 g/dL    HCT 28.5 (L) 41.1 - 50.3 %    MCV 86.9 79.6 - 97.8 FL    MCH 29.3 26.1 - 32.9 PG    MCHC 33.7 31.4 - 35.0 g/dL    RDW 13.6 11.9 - 14.6 %    PLATELET 374 (H) 488 - 450 K/uL    MPV 8.6 (L) 9.4 - 12.3 FL    ABSOLUTE NRBC 0.00 0.0 - 0.2 K/uL    DF AUTOMATED      NEUTROPHILS 87 (H) 43 - 78 %    LYMPHOCYTES 6 (L) 13 - 44 %    MONOCYTES 5 4.0 - 12.0 %    EOSINOPHILS 1 0.5 - 7.8 %    BASOPHILS 0 0.0 - 2.0 %    IMMATURE GRANULOCYTES 1 0.0 - 5.0 %    ABS. NEUTROPHILS 13.4 (H) 1.7 - 8.2 K/UL    ABS. LYMPHOCYTES 1.0 0.5 - 4.6 K/UL    ABS. MONOCYTES 0.8 0.1 - 1.3 K/UL    ABS. EOSINOPHILS 0.1 0.0 - 0.8 K/UL    ABS. BASOPHILS 0.0 0.0 - 0.2 K/UL    ABS. IMM.  GRANS. 0.1 0.0 - 0.5 K/UL   METABOLIC PANEL, BASIC    Collection Time: 04/20/21  9:44 AM   Result Value Ref Range    Sodium 131 (L) 138 - 145 mmol/L    Potassium 3.3 (L) 3.5 - 5.1 mmol/L    Chloride 98 98 - 107 mmol/L    CO2 25 21 - 32 mmol/L    Anion gap 8 7 - 16 mmol/L    Glucose 120 (H) 65 - 100 mg/dL    BUN 11 8 - 23 MG/DL    Creatinine 0.73 (L) 0.8 - 1.5 MG/DL    GFR est AA >60 >60 ml/min/1.73m2    GFR est non-AA >60 >60 ml/min/1.73m2    Calcium 8.4 8.3 - 10.4 MG/DL       All Micro Results     Procedure Component Value Units Date/Time    CULTURE, BLOOD [[id number]] Collected: 04/17/21 0832    Order Status: Completed Specimen: Blood Updated: 04/20/21 1027     Special Requests: --        LEFT  HAND       Culture result: NO GROWTH 3 DAYS       CULTURE, BLOOD [[id number]] Collected: 04/16/21 1820    Order Status: Completed Specimen: Blood Updated: 04/20/21 1026     Special Requests: --        NO SPECIAL REQUESTS  RIGHT  ARM       Culture result: NO GROWTH 4 DAYS       QUANTIFERON-TB GOLD PLUS [[id number]] Collected: 04/19/21 1841    Order Status: Completed Specimen: Blood Updated: 04/19/21 1913    AFB CULTURE + SMEAR W/RFLX ID FROM CULTURE [[id number]] Collected: 04/18/21 2032    Order Status: Completed Updated: 04/18/21 2316    AFB CULTURE + SMEAR W/RFLX ID FROM CULTURE [[id number]] Collected: 04/18/21 1814 Order Status: Completed Updated: 04/18/21 1852    AFB CULTURE + SMEAR W/RFLX ID FROM CULTURE [[id number]] Collected: 04/18/21 1510    Order Status: Completed Updated: 04/18/21 1617          Current Facility-Administered Medications   Medication Dose Route Frequency    Vancomycin Trough Reminder   Other ONCE    sodium chloride (OCEAN) 0.65 % nasal squeeze bottle 2 Spray  2 Spray Both Nostrils Q2H PRN    sodium chloride 3% hypertonic nebulizer soln  4 mL Nebulization BID PRN    nitroglycerin (NITROSTAT) tablet 0.4 mg  0.4 mg SubLINGual PRN    aspirin delayed-release tablet 81 mg  81 mg Oral DAILY    bisacodyL (DULCOLAX) tablet 5 mg  5 mg Oral DAILY PRN    cholecalciferol (VITAMIN D3) (1000 Units /25 mcg) tablet 2,000 Units  2,000 Units Oral DAILY    mexiletine (MEXITIL) capsule 200 mg (Patient Supplied)  200 mg Oral BID    fluticasone propionate (FLONASE) 50 mcg/actuation nasal spray 2 Spray  2 Spray Both Nostrils DAILY    carvediloL (COREG) tablet 12.5 mg  12.5 mg Oral BID    ezetimibe (ZETIA) tablet 10 mg  10 mg Oral DAILY    hydroCHLOROthiazide (HYDRODIURIL) tablet 25 mg  25 mg Oral DAILY    lisinopriL (PRINIVIL, ZESTRIL) tablet 5 mg  5 mg Oral DAILY    pantoprazole (PROTONIX) tablet 40 mg  40 mg Oral ACB    atorvastatin (LIPITOR) tablet 20 mg  20 mg Oral DAILY    sotaloL (BETAPACE) tablet 120 mg  120 mg Oral BID    ranolazine ER (RANEXA) tablet 500 mg  500 mg Oral BID    budesonide-formoteroL (SYMBICORT) 160-4.5 mcg/actuation HFA inhaler 2 Puff  2 Puff Inhalation BID RT    sodium chloride (NS) flush 5-40 mL  5-40 mL IntraVENous Q8H    sodium chloride (NS) flush 5-40 mL  5-40 mL IntraVENous PRN    acetaminophen (TYLENOL) tablet 650 mg  650 mg Oral Q6H PRN    Or    acetaminophen (TYLENOL) suppository 650 mg  650 mg Rectal Q6H PRN    polyethylene glycol (MIRALAX) packet 17 g  17 g Oral DAILY    senna-docusate (PERICOLACE) 8.6-50 mg per tablet 1 Tab  1 Tab Oral BID    magnesium hydroxide (MILK OF MAGNESIA) 400 mg/5 mL oral suspension 30 mL  30 mL Oral DAILY PRN    bisacodyL (DULCOLAX) suppository 10 mg  10 mg Rectal DAILY PRN    enoxaparin (LOVENOX) injection 40 mg  40 mg SubCUTAneous DAILY    tiotropium bromide (SPIRIVA RESPIMAT) 2.5 mcg /actuation  2 Puff Inhalation DAILY    piperacillin-tazobactam (ZOSYN) 3.375 g in 0.9% sodium chloride (MBP/ADV) 100 mL MBP  3.375 g IntraVENous Q8H    vancomycin (VANCOCIN) 1250 mg in  ml infusion  1,250 mg IntraVENous Q12H       Other Studies:  No results found for this visit on 04/16/21. No results found. SARS-CoV-2 Lab Results  \"Novel Coronavirus\" Test: No results found for: COV2NT   \"Emergent Disease\" Test: No results found for: EDPR  \"SARS-COV-2\" Test: No results found for: XGCOVT  Rapid Test: No results found for: COVR            Part of this note was written by using a voice dictation software and the note has been proof read but may still contain some grammatical/other typographical errors.     Signed:  Nelia Valenzuela MD

## 2021-04-20 NOTE — PROGRESS NOTES
Blood pressure 100/65. MD John Coburn made aware and stated to hold scheduled coreg. Will continue to monitor.

## 2021-04-20 NOTE — PROGRESS NOTES
Patient sitting up in bed watching TV. No needs expressed at this time. No s/s of distress. Safety measures in place. Will continue to monitor.

## 2021-04-21 LAB
ANION GAP SERPL CALC-SCNC: 8 MMOL/L (ref 7–16)
BASOPHILS # BLD: 0 K/UL (ref 0–0.2)
BASOPHILS NFR BLD: 0 % (ref 0–2)
BUN SERPL-MCNC: 11 MG/DL (ref 8–23)
CALCIUM SERPL-MCNC: 8.3 MG/DL (ref 8.3–10.4)
CHLORIDE SERPL-SCNC: 98 MMOL/L (ref 98–107)
CO2 SERPL-SCNC: 25 MMOL/L (ref 21–32)
CREAT SERPL-MCNC: 0.63 MG/DL (ref 0.8–1.5)
DIFFERENTIAL METHOD BLD: ABNORMAL
EOSINOPHIL # BLD: 0.1 K/UL (ref 0–0.8)
EOSINOPHIL NFR BLD: 0 % (ref 0.5–7.8)
ERYTHROCYTE [DISTWIDTH] IN BLOOD BY AUTOMATED COUNT: 13.9 % (ref 11.9–14.6)
GALACTOMANNAN AG SPEC IA-ACNC: 0.04 INDEX (ref 0–0.49)
GLUCOSE SERPL-MCNC: 98 MG/DL (ref 65–100)
HCT VFR BLD AUTO: 28.3 % (ref 41.1–50.3)
HGB BLD-MCNC: 9.3 G/DL (ref 13.6–17.2)
IMM GRANULOCYTES # BLD AUTO: 0.1 K/UL (ref 0–0.5)
IMM GRANULOCYTES NFR BLD AUTO: 1 % (ref 0–5)
LYMPHOCYTES # BLD: 1 K/UL (ref 0.5–4.6)
LYMPHOCYTES NFR BLD: 7 % (ref 13–44)
MCH RBC QN AUTO: 28.4 PG (ref 26.1–32.9)
MCHC RBC AUTO-ENTMCNC: 32.9 G/DL (ref 31.4–35)
MCV RBC AUTO: 86.5 FL (ref 79.6–97.8)
MONOCYTES # BLD: 1 K/UL (ref 0.1–1.3)
MONOCYTES NFR BLD: 7 % (ref 4–12)
NEUTS SEG # BLD: 11.3 K/UL (ref 1.7–8.2)
NEUTS SEG NFR BLD: 85 % (ref 43–78)
NRBC # BLD: 0 K/UL (ref 0–0.2)
PLATELET # BLD AUTO: 650 K/UL (ref 150–450)
PMV BLD AUTO: 8.6 FL (ref 9.4–12.3)
POTASSIUM SERPL-SCNC: 3.3 MMOL/L (ref 3.5–5.1)
RBC # BLD AUTO: 3.27 M/UL (ref 4.23–5.6)
SODIUM SERPL-SCNC: 131 MMOL/L (ref 138–145)
WBC # BLD AUTO: 13.3 K/UL (ref 4.3–11.1)

## 2021-04-21 PROCEDURE — 74011250636 HC RX REV CODE- 250/636: Performed by: HOSPITALIST

## 2021-04-21 PROCEDURE — 99232 SBSQ HOSP IP/OBS MODERATE 35: CPT | Performed by: INTERNAL MEDICINE

## 2021-04-21 PROCEDURE — 74011250637 HC RX REV CODE- 250/637: Performed by: HOSPITALIST

## 2021-04-21 PROCEDURE — 94760 N-INVAS EAR/PLS OXIMETRY 1: CPT

## 2021-04-21 PROCEDURE — 36415 COLL VENOUS BLD VENIPUNCTURE: CPT

## 2021-04-21 PROCEDURE — 74011000258 HC RX REV CODE- 258: Performed by: HOSPITALIST

## 2021-04-21 PROCEDURE — 85025 COMPLETE CBC W/AUTO DIFF WBC: CPT

## 2021-04-21 PROCEDURE — 80048 BASIC METABOLIC PNL TOTAL CA: CPT

## 2021-04-21 PROCEDURE — 74011250637 HC RX REV CODE- 250/637: Performed by: FAMILY MEDICINE

## 2021-04-21 PROCEDURE — 65270000029 HC RM PRIVATE

## 2021-04-21 RX ORDER — POTASSIUM CHLORIDE 20 MEQ/1
40 TABLET, EXTENDED RELEASE ORAL 2 TIMES DAILY
Status: COMPLETED | OUTPATIENT
Start: 2021-04-21 | End: 2021-04-21

## 2021-04-21 RX ADMIN — CHOLECALCIFEROL TAB 25 MCG (1000 UNIT) 2000 UNITS: 25 TAB at 08:40

## 2021-04-21 RX ADMIN — ASPIRIN 81 MG: 81 TABLET ORAL at 08:40

## 2021-04-21 RX ADMIN — MEXILETINE HYDROCHLORIDE 200 MG: 200 CAPSULE ORAL at 09:00

## 2021-04-21 RX ADMIN — PIPERACILLIN SODIUM AND TAZOBACTAM SODIUM 3.38 G: 3; .375 INJECTION, POWDER, LYOPHILIZED, FOR SOLUTION INTRAVENOUS at 20:14

## 2021-04-21 RX ADMIN — EZETIMIBE 10 MG: 10 TABLET ORAL at 08:40

## 2021-04-21 RX ADMIN — PANTOPRAZOLE SODIUM 40 MG: 40 TABLET, DELAYED RELEASE ORAL at 06:18

## 2021-04-21 RX ADMIN — CARVEDILOL 12.5 MG: 12.5 TABLET, FILM COATED ORAL at 08:40

## 2021-04-21 RX ADMIN — SOTALOL HYDROCHLORIDE 120 MG: 80 TABLET ORAL at 08:42

## 2021-04-21 RX ADMIN — Medication 10 ML: at 22:00

## 2021-04-21 RX ADMIN — ENOXAPARIN SODIUM 40 MG: 40 INJECTION SUBCUTANEOUS at 08:42

## 2021-04-21 RX ADMIN — BUDESONIDE AND FORMOTEROL FUMARATE DIHYDRATE 2 PUFF: 160; 4.5 AEROSOL RESPIRATORY (INHALATION) at 19:55

## 2021-04-21 RX ADMIN — Medication 10 ML: at 06:18

## 2021-04-21 RX ADMIN — PIPERACILLIN SODIUM AND TAZOBACTAM SODIUM 3.38 G: 3; .375 INJECTION, POWDER, LYOPHILIZED, FOR SOLUTION INTRAVENOUS at 12:09

## 2021-04-21 RX ADMIN — SENNOSIDES AND DOCUSATE SODIUM 1 TABLET: 8.6; 5 TABLET ORAL at 20:14

## 2021-04-21 RX ADMIN — SENNOSIDES AND DOCUSATE SODIUM 1 TABLET: 8.6; 5 TABLET ORAL at 08:41

## 2021-04-21 RX ADMIN — Medication 10 ML: at 13:10

## 2021-04-21 RX ADMIN — POTASSIUM CHLORIDE 40 MEQ: 20 TABLET, EXTENDED RELEASE ORAL at 17:15

## 2021-04-21 RX ADMIN — VANCOMYCIN HYDROCHLORIDE 1250 MG: 10 INJECTION, POWDER, LYOPHILIZED, FOR SOLUTION INTRAVENOUS at 01:07

## 2021-04-21 RX ADMIN — RANOLAZINE 500 MG: 500 TABLET, FILM COATED, EXTENDED RELEASE ORAL at 17:15

## 2021-04-21 RX ADMIN — RANOLAZINE 500 MG: 500 TABLET, FILM COATED, EXTENDED RELEASE ORAL at 08:41

## 2021-04-21 RX ADMIN — CARVEDILOL 12.5 MG: 12.5 TABLET, FILM COATED ORAL at 17:15

## 2021-04-21 RX ADMIN — POTASSIUM CHLORIDE 40 MEQ: 20 TABLET, EXTENDED RELEASE ORAL at 08:40

## 2021-04-21 RX ADMIN — HYDROCHLOROTHIAZIDE 25 MG: 25 TABLET ORAL at 08:40

## 2021-04-21 RX ADMIN — LISINOPRIL 5 MG: 5 TABLET ORAL at 08:41

## 2021-04-21 RX ADMIN — ATORVASTATIN CALCIUM 20 MG: 20 TABLET, FILM COATED ORAL at 08:41

## 2021-04-21 RX ADMIN — VANCOMYCIN HYDROCHLORIDE 1250 MG: 10 INJECTION, POWDER, LYOPHILIZED, FOR SOLUTION INTRAVENOUS at 12:09

## 2021-04-21 RX ADMIN — SOTALOL HYDROCHLORIDE 120 MG: 80 TABLET ORAL at 17:15

## 2021-04-21 RX ADMIN — PIPERACILLIN SODIUM AND TAZOBACTAM SODIUM 3.38 G: 3; .375 INJECTION, POWDER, LYOPHILIZED, FOR SOLUTION INTRAVENOUS at 04:47

## 2021-04-21 NOTE — PROGRESS NOTES
Pharmacokinetic Consult to Pharmacist    Bhargaviisrael Goldberg is a 72 y.o. male being treated with Vancomycin. Height: 5' 9\" (175.3 cm)  Weight: 81.3 kg (179 lb 3.2 oz)  Lab Results   Component Value Date/Time    BUN 11 04/21/2021 05:57 AM    Creatinine 0.63 (L) 04/21/2021 05:57 AM    WBC 13.3 (H) 04/21/2021 05:57 AM    Procalcitonin 0.58 04/16/2021 03:53 PM    Lactic acid 1.3 04/16/2021 06:20 PM    Lactic acid 1.7 11/27/2015 11:48 AM      Estimated Creatinine Clearance: 99.6 mL/min (A) (by C-G formula based on SCr of 0.63 mg/dL (L)). Lab Results   Component Value Date/Time    Vancomycin,trough 17.9 04/20/2021 08:02 PM           Day 6 of Vancomycin. Goal Trough 15-20. Patient with trough of 17.9 drawn appropriately. Will continue current maintenance regimen of 1250mg IV q12h. Will continue to follow patient and order levels when clinically indicated.     Thanks,   Pastor Dobbins, PharmD  PGY1 Pharmacy Resident  [phone]

## 2021-04-21 NOTE — PROGRESS NOTES
Pt sitting up in bed watching tv. Alert and oriented times 3 at this time. 2L NC in place. No s/sx of distress noted. Denies any pain at this time. Encouraged to call for assistance as needed. Call light within reach. Will continue to monitor.

## 2021-04-21 NOTE — PROGRESS NOTES
[facility] Hospitalist Note     Admit Date:  2021  5:37 PM   Name:  Evelyn Robert   Age:  72 y.o.  :  1950   MRN:  [id number]   PCP:  Redd Moss MD  Treatment Team: Attending Provider: Ashley Ervin MD; Consulting Provider: Jaja Peterson MD; Care Manager: Marisol Jennings, RN; Primary Nurse: Dwayne Ly RN; Physical Therapy Assistant: Rhoda Joshi PTA    HPI/Subjective:     Patient is a 72 y.o. male with medical h/o history of chronic systolic heart failure, status post AICD, COPD, hypertensions, CAD, status post CABG, hyperlipidemia presented to the ED with worsening shortness of breath and cough. Chest CT scan showed right upper lobe consolidation with a 5 cm area of superimposed cavitation. Patient has received Covid vaccine approximately 1 week ago. He was admitted with severe sepsis secondary to suspected bacterial pneumonia with elevated procalcitonin. He was started on vancomycin and Zosyn. Tuberculosis and primary malignancy with cavitation in differential.  Patient is started on airborne precautions. Pulmonology consulted. AFB culture x3  Negative. QuantiFERON and Aspergillus tests are pending. Patient is scheduled for outpatient navigational bronc with EBUS on 2021.    : Patient is seen at the bedside. Complaining of copious amount of secretions. Denies nausea, vomiting or abdominal pain. Denies chest pain or shortness of breath. Assessment and Plan:     Severe sepsis secondary to suspected bacterial pneumonia:  Leukocytosis improving  CT scan with right upper lobe consolidation with a 5 cm area of superimposed cavitation.   Tuberculosis and primary malignancy with cavitation and differential  AFB x3  Negative, airborne precautions discontinued  Follow-up QuantiFERON, Aspergillus Ag  Pulmonology consulted and recommend OP navigational bronchoscopy with biopsy and staging EBUS on 2021  Continue vancomycin and Zosyn, switch to orals tomorrow  Blood cultures till date daily negative    CAD/hypertension/hyperlipidemia/chronic systolic heart failure/post AICD:  Stable  Continue home meds aspirin, atorvastatin, carvedilol, ezetimibe, hydrochlorothiazide, lisinopril    COPD:  Continue nebs treatment  Continue Symbicort and Spiriva    Discharge planning: Plan to discharge patient tomorrow after switching to oral antibiotics    DVT ppx ordered  Code status:  Full  Estimated LOS:  Greater than 2 midnights  Risk:  high    Hospital Problems as of 4/21/2021 Date Reviewed: 4/21/2021          Codes Class Noted - Resolved POA    Acute respiratory failure with hypoxia ([facility] [address] ICD-10-CM: J96.01  ICD-9-CM: 518.81  4/20/2021 - Present Unknown        * (Principal) Cavitary lung disease ICD-10-CM: J98.4  ICD-9-CM: 518.89  4/16/2021 - Present Unknown        GERD (gastroesophageal reflux disease) ICD-10-CM: K21.9  ICD-9-CM: 530.81  Unknown - Present Yes        Ischemic cardiomyopathy ICD-10-CM: I25.5  ICD-9-CM: 414.8  6/30/2017 - Present Yes    Overview Signed 3/19/2020 10:22 AM by Redd Moss MD     Last Assessment & Plan:   He has no symptoms of volume overload. We will continue his current regimen. Follow-up in 6 months with an echocardiogram prior to his return. Systolic CHF, chronic (HCC) ICD-10-CM: I50.22  ICD-9-CM: 428.22, 428.0  11/15/2011 - Present Yes                10 systems reviewed and negative except as noted in HPI.   Past Medical History:   Diagnosis Date    CAD (coronary artery disease)     CABG 2001, MI 5-2001    Callus of foot     history of removal    Chronic obstructive pulmonary disease ([facility] [address]     Coronary atherosclerosis of native coronary vessel 12/28/2015    GERD (gastroesophageal reflux disease)     History of dental examination 01/01/2017    History of heart failure     History of palpitations     Hypertension     OA (osteoarthritis)     Pacemaker     S/P CABG (coronary artery bypass graft)     S/P coronary artery bypass graft x 3     Ventricular arrhythmia       Past Surgical History:   Procedure Laterality Date    HX CATARACT REMOVAL      2- RIGHT EYE AND 3- LEFT EYE    HX HEENT      tooth extraction    HX ORTHOPAEDIC Right     foot callus    HX PACEMAKER      ICD    [place] CARDIAC SURG PROCEDURE UNLIST      cabg x3     VASCULAR SURGERY PROCEDURE UNLIST        Allergies   Allergen Reactions    Ventolin [Albuterol Sulfate] Other (comments)     I used it once and woke up in the kitchen floor after my defibrillator went off.  Amiodarone Other (comments)     STUMBLES     Inspra [Eplerenone] Hives    Niaspan [Niacin] Hives    Nitroglycerin Other (comments)     SEVERE HYPOTENSION      Other Medication Anaphylaxis     RASPBERRIES       Social History     Tobacco Use    Smoking status: Former Smoker     Packs/day: 1.50     Years: 30.00     Pack years: 45.00     Types: Cigarettes     Quit date: 4/15/2001     Years since quittin.0    Smokeless tobacco: Never Used   Substance Use Topics    Alcohol use: Yes     Frequency: 2-4 times a month     Drinks per session: 3 or 4     Binge frequency: Never     Comment: social, 3 or less per week      Family History   Problem Relation Age of Onset    Hypertension Father     Heart Disease Brother     Hypertension Brother     No Known Problems Mother     Coronary Artery Disease Other         family hx    No Known Problems Maternal Grandmother     No Known Problems Maternal Grandfather     No Known Problems Paternal Grandmother     No Known Problems Paternal Grandfather       Family history reviewed and noncontributory.   Immunization History   Administered Date(s) Administered    Influenza Vaccine 2014, 10/01/2015, 10/01/2016, 2018    Influenza Vaccine (Tri) Adjuvanted (>65 Yrs FLUAD TRI 41966) 2019    Influenza Vaccine Split 2012    Pneumococcal Polysaccharide (PPSV-23) 2016    Pneumococcal Vaccine (Unspecified Type) 2014    TB Skin Test (PPD) Intradermal 2021     PTA Medications:  Prior to Admission Medications   Prescriptions Last Dose Informant Patient Reported? Taking? B.infantis-B.ani-B.long-B.bifi (Probiotic 4X) 10-15 mg TbEC   Yes No   Sig: Take  by mouth. aspirin delayed-release 81 mg tablet   Yes No   Sig: Take 81 mg by mouth daily. bisacodyL (Dulcolax, bisacodyl,) 5 mg EC tablet   Yes No   Sig: Take 5 mg by mouth daily as needed for Constipation. carvediloL (COREG) 12.5 mg tablet   No No   Sig: Take 1 Tab by mouth two (2) times a day. cholecalciferol (Vitamin D3) 25 mcg (1,000 unit) cap   Yes No   Sig: Take  by mouth daily. ezetimibe (ZETIA) 10 mg tablet   No No   Sig: TAKE 1 TABLET DAILY   fluticasone propion-salmeteroL (Advair Diskus) 250-50 mcg/dose diskus inhaler   No No   Sig: USE 1 INHALATION TWICE A DAY IN THE MORNING AND EVENING APPROXIMATELY 12 HOURS APART   fluticasone propionate (FLONASE) 50 mcg/actuation nasal spray   No No   Si Sprays by Both Nostrils route daily. hydroCHLOROthiazide (HYDRODIURIL) 25 mg tablet   No No   Sig: Take 1 Tab by mouth daily. lisinopriL (PRINIVIL, ZESTRIL) 5 mg tablet   No No   Sig: Take 1 Tab by mouth daily. mexiletine (MEXITIL) 200 mg capsule   No No   Sig: Take 1 Cap by mouth two (2) times a day. multivitamin (ONE A DAY) tablet   Yes No   Sig: Take 1 Tab by mouth daily. nitroglycerin (NITROSTAT) 0.4 mg SL tablet   Yes No   Sig: by SubLINGual route every five (5) minutes as needed. pantoprazole (PROTONIX) 40 mg tablet   No No   Sig: TAKE 1 TABLET DAILY   ranolazine ER (Ranexa) 500 mg SR tablet   No No   Sig: Take 1 Tab by mouth two (2) times a day. simvastatin (ZOCOR) 40 mg tablet   No No   Sig: TAKE 1 TABLET NIGHTLY   sotaloL (BETAPACE) 120 mg tablet   No No   Sig: Take 1 Tab by mouth two (2) times a day.    tiotropium (Spiriva with HandiHaler) 18 mcg inhalation capsule   No No   Sig: INHALE THE CONTENTS OF 1 CAPSULE DAILY Facility-Administered Medications: None       Objective:     Patient Vitals for the past 24 hrs:   Temp Pulse Resp BP SpO2   04/21/21 1153 97.5 °F (36.4 °C) 86 20 122/71 91 %   04/21/21 0757     91 %   04/21/21 0743 98.4 °F (36.9 °C) 81 20 125/72 93 %   04/21/21 0406 98 °F (36.7 °C) 75 18 127/68 94 %   04/20/21 2310 98.2 °F (36.8 °C) 82 18 125/63 93 %   04/20/21 2016     95 %   04/20/21 1934 98 °F (36.7 °C) 80 18 118/67 92 %   04/20/21 1517 98.2 °F (36.8 °C) 69 18 100/65 93 %     Oxygen Therapy  O2 Sat (%): 91 % (04/21/21 1153)  Pulse via Oximetry: 82 beats per minute (04/21/21 0757)  O2 Device: None (Room air) (04/21/21 0757)  Skin Assessment: Clean, dry, & intact (04/20/21 2016)  Skin Protection for O2 Device: No (04/20/21 2016)  O2 Flow Rate (L/min): 0 l/min (04/21/21 0757)  FIO2 (%): 21 % (04/21/21 0757)    Estimated body mass index is 26.46 kg/m² as calculated from the following:    Height as of this encounter: 5' 9\" (1.753 m). Weight as of this encounter: 81.3 kg (179 lb 3.2 oz). Intake/Output Summary (Last 24 hours) at 4/21/2021 1429  Last data filed at 4/21/2021 0423  Gross per 24 hour   Intake    Output 350 ml   Net -350 ml       *Note that automatically entered I/Os may not be accurate; dependent on patient compliance with collection and accurate  by assistants. Physical Exam:  General:    Well nourished. Alert. Eyes:   Normal sclerae. Extraocular movements intact. HENT:  Normocephalic, atraumatic. Moist mucous membranes  CV:   RRR. No m/r/g. Lungs:  Diminished breath sounds to right upper lobe  Abdomen: Soft, nontender, nondistended. Extremities: Warm and dry. No cyanosis or edema. Neurologic: CN II-XII grossly intact. Sensation intact. Skin:     No rashes or jaundice. Normal coloration  Psych:  Normal mood and affect. I reviewed the labs, imaging, EKGs, telemetry, and other studies done this admission.   Data Reviewed:   Recent Results (from the past 25 hour(s))   Victor Manuel Patelt    Collection Time: 04/20/21  8:02 PM   Result Value Ref Range    Vancomycin,trough 17.9 5 - 20 ug/mL   CBC WITH AUTOMATED DIFF    Collection Time: 04/21/21  5:57 AM   Result Value Ref Range    WBC 13.3 (H) 4.3 - 11.1 K/uL    RBC 3.27 (L) 4.23 - 5.6 M/uL    HGB 9.3 (L) 13.6 - 17.2 g/dL    HCT 28.3 (L) 41.1 - 50.3 %    MCV 86.5 79.6 - 97.8 FL    MCH 28.4 26.1 - 32.9 PG    MCHC 32.9 31.4 - 35.0 g/dL    RDW 13.9 11.9 - 14.6 %    PLATELET 343 (H) 124 - 450 K/uL    MPV 8.6 (L) 9.4 - 12.3 FL    ABSOLUTE NRBC 0.00 0.0 - 0.2 K/uL    DF AUTOMATED      NEUTROPHILS 85 (H) 43 - 78 %    LYMPHOCYTES 7 (L) 13 - 44 %    MONOCYTES 7 4.0 - 12.0 %    EOSINOPHILS 0 (L) 0.5 - 7.8 %    BASOPHILS 0 0.0 - 2.0 %    IMMATURE GRANULOCYTES 1 0.0 - 5.0 %    ABS. NEUTROPHILS 11.3 (H) 1.7 - 8.2 K/UL    ABS. LYMPHOCYTES 1.0 0.5 - 4.6 K/UL    ABS. MONOCYTES 1.0 0.1 - 1.3 K/UL    ABS. EOSINOPHILS 0.1 0.0 - 0.8 K/UL    ABS. BASOPHILS 0.0 0.0 - 0.2 K/UL    ABS. IMM.  GRANS. 0.1 0.0 - 0.5 K/UL   METABOLIC PANEL, BASIC    Collection Time: 04/21/21  5:57 AM   Result Value Ref Range    Sodium 131 (L) 138 - 145 mmol/L    Potassium 3.3 (L) 3.5 - 5.1 mmol/L    Chloride 98 98 - 107 mmol/L    CO2 25 21 - 32 mmol/L    Anion gap 8 7 - 16 mmol/L    Glucose 98 65 - 100 mg/dL    BUN 11 8 - 23 MG/DL    Creatinine 0.63 (L) 0.8 - 1.5 MG/DL    GFR est AA >60 >60 ml/min/1.73m2    GFR est non-AA >60 >60 ml/min/1.73m2    Calcium 8.3 8.3 - 10.4 MG/DL       All Micro Results     Procedure Component Value Units Date/Time    AFB CULTURE + SMEAR W/RFLX ID FROM CULTURE [[id number]] Collected: 04/18/21 1510    Order Status: Completed Specimen: Miscellaneous sample Updated: 04/20/21 1636     Source EXPECTORATED SPUTUM        AFB Specimen processing Concentration     Acid Fast Smear Negative        Comment: (NOTE)  Performed At: [address] [id number]  Linda Goncalves MD TZ:[id number]          Acid Fast Culture PENDING    AFB CULTURE + SMEAR W/RFLX ID FROM CULTURE [[id number]] Collected: 04/18/21 1814    Order Status: Completed Specimen: Miscellaneous sample Updated: 04/20/21 1636     Source EXPECTORATED SPUTUM        Comment: oral  Corrected on 04/18 AT 1815:  This is a correction to the medical record of the test results previously reported as EXPECTORATED SPUTUM          AFB Specimen processing Concentration     Acid Fast Smear Negative        Comment: (NOTE)  Performed At: [facility]  [email or web address] [address] [place] [id number]  Kip Toledo MD XR:[id number]          Acid Fast Culture PENDING    AFB CULTURE + SMEAR W/RFLX ID FROM CULTURE [[id number]] Collected: 04/18/21 2032    Order Status: Completed Specimen: Miscellaneous sample Updated: 04/20/21 1636     Source EXPECTORATED SPUTUM        AFB Specimen processing Concentration     Acid Fast Smear Negative        Comment: (NOTE)  Performed At: [facility]  [email or web address] [address] [id number]  Kip Toledo MD BZ:[id number]          Acid Fast Culture PENDING    CULTURE, BLOOD [[id number]] Collected: 04/17/21 0832    Order Status: Completed Specimen: Blood Updated: 04/20/21 1027     Special Requests: --        LEFT  HAND       Culture result: NO GROWTH 3 DAYS       CULTURE, BLOOD [[id number]] Collected: 04/16/21 1820    Order Status: Completed Specimen: Blood Updated: 04/20/21 1026     Special Requests: --        NO SPECIAL REQUESTS  RIGHT  ARM       Culture result: NO GROWTH 4 DAYS       QUANTIFERON-TB GOLD PLUS [[id number]] Collected: 04/19/21 1841    Order Status: Completed Specimen: Blood Updated: 04/19/21 1913          Current Facility-Administered Medications   Medication Dose Route Frequency    potassium chloride (K-DUR, KLOR-CON) SR tablet 40 mEq  40 mEq Oral BID    sodium chloride (OCEAN) 0.65 % nasal squeeze bottle 2 Spray  2 Spray Both Nostrils Q2H PRN    sodium chloride 3% hypertonic nebulizer soln  4 mL Nebulization BID PRN    nitroglycerin (NITROSTAT) tablet 0.4 mg  0.4 mg SubLINGual PRN    aspirin delayed-release tablet 81 mg  81 mg Oral DAILY    bisacodyL (DULCOLAX) tablet 5 mg  5 mg Oral DAILY PRN    cholecalciferol (VITAMIN D3) (1000 Units /25 mcg) tablet 2,000 Units  2,000 Units Oral DAILY    mexiletine (MEXITIL) capsule 200 mg (Patient Supplied)  200 mg Oral BID    fluticasone propionate (FLONASE) 50 mcg/actuation nasal spray 2 Spray  2 Spray Both Nostrils DAILY    carvediloL (COREG) tablet 12.5 mg  12.5 mg Oral BID    ezetimibe (ZETIA) tablet 10 mg  10 mg Oral DAILY    hydroCHLOROthiazide (HYDRODIURIL) tablet 25 mg  25 mg Oral DAILY    lisinopriL (PRINIVIL, ZESTRIL) tablet 5 mg  5 mg Oral DAILY    pantoprazole (PROTONIX) tablet 40 mg  40 mg Oral ACB    atorvastatin (LIPITOR) tablet 20 mg  20 mg Oral DAILY    sotaloL (BETAPACE) tablet 120 mg  120 mg Oral BID    ranolazine ER (RANEXA) tablet 500 mg  500 mg Oral BID    budesonide-formoteroL (SYMBICORT) 160-4.5 mcg/actuation HFA inhaler 2 Puff  2 Puff Inhalation BID RT    sodium chloride (NS) flush 5-40 mL  5-40 mL IntraVENous Q8H    sodium chloride (NS) flush 5-40 mL  5-40 mL IntraVENous PRN    acetaminophen (TYLENOL) tablet 650 mg  650 mg Oral Q6H PRN    Or    acetaminophen (TYLENOL) suppository 650 mg  650 mg Rectal Q6H PRN    polyethylene glycol (MIRALAX) packet 17 g  17 g Oral DAILY    senna-docusate (PERICOLACE) 8.6-50 mg per tablet 1 Tab  1 Tab Oral BID    magnesium hydroxide (MILK OF MAGNESIA) 400 mg/5 mL oral suspension 30 mL  30 mL Oral DAILY PRN    bisacodyL (DULCOLAX) suppository 10 mg  10 mg Rectal DAILY PRN    enoxaparin (LOVENOX) injection 40 mg  40 mg SubCUTAneous DAILY    tiotropium bromide (SPIRIVA RESPIMAT) 2.5 mcg /actuation  2 Puff Inhalation DAILY    piperacillin-tazobactam (ZOSYN) 3.375 g in 0.9% sodium chloride (MBP/ADV) 100 mL MBP  3.375 g IntraVENous Q8H    vancomycin (VANCOCIN) 1250 mg in  ml infusion  1,250 mg IntraVENous Q12H       Other Studies:  No results found for this visit on 04/16/21. No results found. SARS-CoV-2 Lab Results  \"Novel Coronavirus\" Test: No results found for: COV2NT   \"Emergent Disease\" Test: No results found for: EDPR  \"SARS-COV-2\" Test: No results found for: XGCOVT  Rapid Test: No results found for: COVR            Part of this note was written by using a voice dictation software and the note has been proof read but may still contain some grammatical/other typographical errors.     Signed:  Negin Castellanos MD

## 2021-04-21 NOTE — PROGRESS NOTES
Report received from Fabi Coronel. Patient resting quietly in bed. Respirations present, even and unlabored on room air. Bed low and locked, safety measures in place. No signs of distress, no needs expressed by the patient. Call light within reach, encouraged patient to call with any needs. Will continue to monitor.

## 2021-04-21 NOTE — PROGRESS NOTES
MSN, CM:  Patient still pending TB work up. CT with RUL consolidation with 5 cm area of superimposed cavitation. Continue vanco and zosyn. PT/OT to evaluate for discharge needs. Case Management will continue to follow.

## 2021-04-21 NOTE — PROGRESS NOTES
Pt sitting up in bed awake. On RA. No s/sx of distress noted. Denies any pain. Call light within reach. Will monitor.

## 2021-04-21 NOTE — PROGRESS NOTES
Patient in bed resting quietly. No needs expressed at this time. No s/s of distress.  Report given to Fabi Coronel

## 2021-04-21 NOTE — PROGRESS NOTES
Nader Mohs  Admission Date: 4/16/2021             Daily Progress Note: 4/21/2021    The patient's chart is reviewed and the patient is discussed with the staff. Pt is a 71 yo  male with a history of GOLD stage II COPD, chronic systolic heart failure, s/p AICD, HTN, former smoker (quit 2001), balance issues, CAD, s/p CABG, and hyperlipidmia. Pt presented to the ER on 4/16/21 with shortness of breath and cough 1 week after receiving COVID vaccine. Pt had a chest CT scan in the ER which revealed RUL consolidation with a 5cm area of superimposed cavitation, R hilar and mediastinal lymphadenopathy, and calcified pleural plaques on the L. He was started on Vanc and Zosyn in the ER and admitted by the hospitalist service for sepsis with PNA. Pt was placed on airborne precautions. His Quantiferon and aspergillus tests are pending. His AFB smear x 3 are negative. Subjective:     Pt sitting on the bedside commode on RA-sat 84%. PCT notified and she will recheck now and she will let me know sats. Pt reports that his breathing is better today. He is coughing up copious amounts of secretions with yellow sputum with blood tinging.      Current Facility-Administered Medications   Medication Dose Route Frequency    potassium chloride (K-DUR, KLOR-CON) SR tablet 40 mEq  40 mEq Oral BID    sodium chloride (OCEAN) 0.65 % nasal squeeze bottle 2 Spray  2 Spray Both Nostrils Q2H PRN    sodium chloride 3% hypertonic nebulizer soln  4 mL Nebulization BID PRN    nitroglycerin (NITROSTAT) tablet 0.4 mg  0.4 mg SubLINGual PRN    aspirin delayed-release tablet 81 mg  81 mg Oral DAILY    bisacodyL (DULCOLAX) tablet 5 mg  5 mg Oral DAILY PRN    cholecalciferol (VITAMIN D3) (1000 Units /25 mcg) tablet 2,000 Units  2,000 Units Oral DAILY    mexiletine (MEXITIL) capsule 200 mg (Patient Supplied)  200 mg Oral BID    fluticasone propionate (FLONASE) 50 mcg/actuation nasal spray 2 Spray  2 Spray Both Nostrils DAILY    carvediloL (COREG) tablet 12.5 mg  12.5 mg Oral BID    ezetimibe (ZETIA) tablet 10 mg  10 mg Oral DAILY    hydroCHLOROthiazide (HYDRODIURIL) tablet 25 mg  25 mg Oral DAILY    lisinopriL (PRINIVIL, ZESTRIL) tablet 5 mg  5 mg Oral DAILY    pantoprazole (PROTONIX) tablet 40 mg  40 mg Oral ACB    atorvastatin (LIPITOR) tablet 20 mg  20 mg Oral DAILY    sotaloL (BETAPACE) tablet 120 mg  120 mg Oral BID    ranolazine ER (RANEXA) tablet 500 mg  500 mg Oral BID    budesonide-formoteroL (SYMBICORT) 160-4.5 mcg/actuation HFA inhaler 2 Puff  2 Puff Inhalation BID RT    sodium chloride (NS) flush 5-40 mL  5-40 mL IntraVENous Q8H    sodium chloride (NS) flush 5-40 mL  5-40 mL IntraVENous PRN    acetaminophen (TYLENOL) tablet 650 mg  650 mg Oral Q6H PRN    Or    acetaminophen (TYLENOL) suppository 650 mg  650 mg Rectal Q6H PRN    polyethylene glycol (MIRALAX) packet 17 g  17 g Oral DAILY    senna-docusate (PERICOLACE) 8.6-50 mg per tablet 1 Tab  1 Tab Oral BID    magnesium hydroxide (MILK OF MAGNESIA) 400 mg/5 mL oral suspension 30 mL  30 mL Oral DAILY PRN    bisacodyL (DULCOLAX) suppository 10 mg  10 mg Rectal DAILY PRN    enoxaparin (LOVENOX) injection 40 mg  40 mg SubCUTAneous DAILY    tiotropium bromide (SPIRIVA RESPIMAT) 2.5 mcg /actuation  2 Puff Inhalation DAILY    piperacillin-tazobactam (ZOSYN) 3.375 g in 0.9% sodium chloride (MBP/ADV) 100 mL MBP  3.375 g IntraVENous Q8H    vancomycin (VANCOCIN) 1250 mg in  ml infusion  1,250 mg IntraVENous Q12H       Review of Systems  +cough-yellow with blood tinging  Constitutional: negative for fever, chills, sweats  Cardiovascular: negative for chest pain, palpitations, syncope, edema  Gastrointestinal:  negative for dysphagia, reflux, vomiting, diarrhea, abdominal pain, or melena  Neurologic:  negative for focal weakness, numbness, headache    Objective:     Vitals:    04/20/21 2310 04/21/21 0406 04/21/21 0743 04/21/21 0757   BP: 125/63 127/68 125/72    Pulse: 82 75 81    Resp: 18 18 20    Temp: 98.2 °F (36.8 °C) 98 °F (36.7 °C) 98.4 °F (36.9 °C)    SpO2: 93% 94% 93% 91%   Weight:  179 lb 3.2 oz (81.3 kg)     Height:             Intake/Output Summary (Last 24 hours) at 4/21/2021 1035  Last data filed at 4/21/2021 0423  Gross per 24 hour   Intake 240 ml   Output 350 ml   Net -110 ml       Physical Exam:   Constitution:  the patient is well developed and in no acute distress, on RA  EENMT:  Sclera clear, pupils equal, oral mucosa moist  Respiratory: few crackles on the R  Cardiovascular:  RRR without M,G,R  Gastrointestinal: soft and non-tender; with positive bowel sounds. Musculoskeletal: warm without cyanosis. There is no lower extremity edema. Skin:  no jaundice or rashes   Neurologic: no gross neuro deficits     Psychiatric:  alert and oriented x 3    Chest CT:       LAB  No results for input(s): GLUCPOC in the last 72 hours. No lab exists for component: [place]   Recent Labs     04/21/21  0557 04/20/21  0944 04/19/21  0647   WBC 13.3* 15.4* 18.2*   HGB 9.3* 9.6* 9.8*   HCT 28.3* 28.5* 28.7*   * 663* 690*     Recent Labs     04/21/21  0557 04/20/21  0944 04/19/21  0647   * 131* 130*   K 3.3* 3.3* 3.4*   CL 98 98 96*   CO2 25 25 25   GLU 98 120* 88   BUN 11 11 14   CREA 0.63* 0.73* 0.76*   CA 8.3 8.4 8.6   ALB  --   --  1.4*   TBILI  --   --  1.3*   ALT  --   --  227*     No results for input(s): PH, PCO2, PO2, HCO3, PHI, PCO2I, PO2I, HCO3I in the last 72 hours. No results for input(s): LCAD, LAC in the last 72 hours. Assessment:  (Medical Decision Making)     Hospital Problems  Date Reviewed: 4/21/2021          Codes Class Noted POA    Acute respiratory failure with hypoxia [facility]) ICD-10-CM: J96.01  ICD-9-CM: 518.81  4/20/2021 Unknown    Recheck RA sat was 91%.     * (Principal) Cavitary lung disease ICD-10-CM: J98.4  ICD-9-CM: 518.89  4/16/2021 Unknown    Continue Vanc and Zosyn     GERD (gastroesophageal reflux disease) ICD-10-CM: K21.9  ICD-9-CM: 530.81  Unknown Yes    Chronic     Ischemic cardiomyopathy ICD-10-CM: I25.5  ICD-9-CM: 414.8  6/30/2017 Yes    Overview Signed 3/19/2020 10:22 AM by Jose Bangura MD     Last Assessment & Plan:   He has no symptoms of volume overload. We will continue his current regimen. Follow-up in 6 months with an echocardiogram prior to his return. Systolic CHF, chronic (HCC) ICD-10-CM: I50.22  ICD-9-CM: 428.22, 428.0  11/15/2011 Yes              Plan:  (Medical Decision Making)     --on RA-sat 91%. --continue Vanc/Zosyn day 6  --AFB smear x 3 negative, discontinue isolation precautions  --awaiting aspergillus and Quantiferon Gold tests to result  --AFB smears negative, Pt now scheduled for outpt navigational bronch with [facility] on  4/29/21 with arrival at 6:30am. Pt will need COVID test tomorrow in preparation for procedure next week. More than 50% of the time documented was spent in face-to-face contact with the patient and in the care of the patient on the floor/unit where the patient is located. COLEEN Hightower    Lungs:  clear  Heart:  RRR with no Murmur/Rubs/Gallops    Additional Comments:  Can change to po antibx and send home tomorow     I have spoken with and examined the patient. I agree with the above assessment and plan as documented.     Sylwia Hamilton MD

## 2021-04-22 ENCOUNTER — HOME HEALTH ADMISSION (OUTPATIENT)
Dept: HOME HEALTH SERVICES | Facility: HOME HEALTH | Age: 71
End: 2021-04-22
Payer: MEDICARE

## 2021-04-22 VITALS
HEART RATE: 88 BPM | DIASTOLIC BLOOD PRESSURE: 68 MMHG | HEIGHT: 69 IN | BODY MASS INDEX: 26.54 KG/M2 | OXYGEN SATURATION: 91 % | TEMPERATURE: 97.7 F | WEIGHT: 179.2 LBS | RESPIRATION RATE: 18 BRPM | SYSTOLIC BLOOD PRESSURE: 118 MMHG

## 2021-04-22 LAB
ANION GAP SERPL CALC-SCNC: 7 MMOL/L (ref 7–16)
BACTERIA SPEC CULT: NORMAL
BACTERIA SPEC CULT: NORMAL
BASOPHILS # BLD: 0 K/UL (ref 0–0.2)
BASOPHILS NFR BLD: 0 % (ref 0–2)
BUN SERPL-MCNC: 9 MG/DL (ref 8–23)
CALCIUM SERPL-MCNC: 8.6 MG/DL (ref 8.3–10.4)
CHLORIDE SERPL-SCNC: 99 MMOL/L (ref 98–107)
CO2 SERPL-SCNC: 26 MMOL/L (ref 21–32)
COVID-19 RAPID TEST, COVR: NOT DETECTED
CREAT SERPL-MCNC: 0.82 MG/DL (ref 0.8–1.5)
DIFFERENTIAL METHOD BLD: ABNORMAL
EOSINOPHIL # BLD: 0.1 K/UL (ref 0–0.8)
EOSINOPHIL NFR BLD: 1 % (ref 0.5–7.8)
ERYTHROCYTE [DISTWIDTH] IN BLOOD BY AUTOMATED COUNT: 14 % (ref 11.9–14.6)
GLUCOSE SERPL-MCNC: 109 MG/DL (ref 65–100)
HCT VFR BLD AUTO: 30.9 % (ref 41.1–50.3)
HGB BLD-MCNC: 10.1 G/DL (ref 13.6–17.2)
IMM GRANULOCYTES # BLD AUTO: 0.1 K/UL (ref 0–0.5)
IMM GRANULOCYTES NFR BLD AUTO: 1 % (ref 0–5)
LYMPHOCYTES # BLD: 1.2 K/UL (ref 0.5–4.6)
LYMPHOCYTES NFR BLD: 9 % (ref 13–44)
M TB IFN-G BLD-IMP: NORMAL
MCH RBC QN AUTO: 28.7 PG (ref 26.1–32.9)
MCHC RBC AUTO-ENTMCNC: 32.7 G/DL (ref 31.4–35)
MCV RBC AUTO: 87.8 FL (ref 79.6–97.8)
MONOCYTES # BLD: 0.8 K/UL (ref 0.1–1.3)
MONOCYTES NFR BLD: 6 % (ref 4–12)
NEUTS SEG # BLD: 11.1 K/UL (ref 1.7–8.2)
NEUTS SEG NFR BLD: 83 % (ref 43–78)
NRBC # BLD: 0 K/UL (ref 0–0.2)
PLATELET # BLD AUTO: 696 K/UL (ref 150–450)
PMV BLD AUTO: 8.5 FL (ref 9.4–12.3)
POTASSIUM SERPL-SCNC: 3.7 MMOL/L (ref 3.5–5.1)
QUANTIFERON CRITERIA, QFI1T: NORMAL
QUANTIFERON INCUBATION, QF1T: NORMAL
QUANTIFERON MITOGEN VALUE: >10 IU/ML
QUANTIFERON NIL VALUE: 0.04 IU/ML
QUANTIFERON TB1 AG: 0.03 IU/ML
QUANTIFERON TB2 AG: 0.1 IU/ML
RBC # BLD AUTO: 3.52 M/UL (ref 4.23–5.6)
SERVICE CMNT-IMP: NORMAL
SERVICE CMNT-IMP: NORMAL
SODIUM SERPL-SCNC: 132 MMOL/L (ref 138–145)
SOURCE, COVRS: NORMAL
WBC # BLD AUTO: 13.3 K/UL (ref 4.3–11.1)

## 2021-04-22 PROCEDURE — 74011250637 HC RX REV CODE- 250/637: Performed by: HOSPITALIST

## 2021-04-22 PROCEDURE — 94761 N-INVAS EAR/PLS OXIMETRY MLT: CPT

## 2021-04-22 PROCEDURE — 74011000258 HC RX REV CODE- 258: Performed by: HOSPITALIST

## 2021-04-22 PROCEDURE — 87635 SARS-COV-2 COVID-19 AMP PRB: CPT

## 2021-04-22 PROCEDURE — 99232 SBSQ HOSP IP/OBS MODERATE 35: CPT | Performed by: INTERNAL MEDICINE

## 2021-04-22 PROCEDURE — 80048 BASIC METABOLIC PNL TOTAL CA: CPT

## 2021-04-22 PROCEDURE — 36415 COLL VENOUS BLD VENIPUNCTURE: CPT

## 2021-04-22 PROCEDURE — 85025 COMPLETE CBC W/AUTO DIFF WBC: CPT

## 2021-04-22 PROCEDURE — 74011250636 HC RX REV CODE- 250/636: Performed by: HOSPITALIST

## 2021-04-22 RX ORDER — AMOXICILLIN AND CLAVULANATE POTASSIUM 875; 125 MG/1; MG/1
1 TABLET, FILM COATED ORAL EVERY 12 HOURS
Status: DISCONTINUED | OUTPATIENT
Start: 2021-04-22 | End: 2021-04-22 | Stop reason: HOSPADM

## 2021-04-22 RX ORDER — AMOXICILLIN AND CLAVULANATE POTASSIUM 875; 125 MG/1; MG/1
1 TABLET, FILM COATED ORAL EVERY 12 HOURS
Qty: 70 TAB | Refills: 0 | Status: SHIPPED | OUTPATIENT
Start: 2021-04-23 | End: 2021-04-22

## 2021-04-22 RX ORDER — AMOXICILLIN AND CLAVULANATE POTASSIUM 875; 125 MG/1; MG/1
1 TABLET, FILM COATED ORAL EVERY 12 HOURS
Qty: 70 TAB | Refills: 0 | Status: SHIPPED | OUTPATIENT
Start: 2021-04-23 | End: 2021-05-28

## 2021-04-22 RX ADMIN — Medication 10 ML: at 12:05

## 2021-04-22 RX ADMIN — CHOLECALCIFEROL TAB 25 MCG (1000 UNIT) 2000 UNITS: 25 TAB at 08:40

## 2021-04-22 RX ADMIN — PIPERACILLIN SODIUM AND TAZOBACTAM SODIUM 3.38 G: 3; .375 INJECTION, POWDER, LYOPHILIZED, FOR SOLUTION INTRAVENOUS at 04:49

## 2021-04-22 RX ADMIN — ENOXAPARIN SODIUM 40 MG: 40 INJECTION SUBCUTANEOUS at 08:40

## 2021-04-22 RX ADMIN — RANOLAZINE 500 MG: 500 TABLET, FILM COATED, EXTENDED RELEASE ORAL at 08:40

## 2021-04-22 RX ADMIN — LISINOPRIL 5 MG: 5 TABLET ORAL at 08:40

## 2021-04-22 RX ADMIN — EZETIMIBE 10 MG: 10 TABLET ORAL at 08:40

## 2021-04-22 RX ADMIN — ASPIRIN 81 MG: 81 TABLET ORAL at 08:40

## 2021-04-22 RX ADMIN — VANCOMYCIN HYDROCHLORIDE 1250 MG: 10 INJECTION, POWDER, LYOPHILIZED, FOR SOLUTION INTRAVENOUS at 00:20

## 2021-04-22 RX ADMIN — CARVEDILOL 12.5 MG: 12.5 TABLET, FILM COATED ORAL at 08:40

## 2021-04-22 RX ADMIN — VANCOMYCIN HYDROCHLORIDE 1250 MG: 10 INJECTION, POWDER, LYOPHILIZED, FOR SOLUTION INTRAVENOUS at 12:08

## 2021-04-22 RX ADMIN — FLUTICASONE PROPIONATE 2 SPRAY: 50 SPRAY, METERED NASAL at 08:43

## 2021-04-22 RX ADMIN — ATORVASTATIN CALCIUM 20 MG: 20 TABLET, FILM COATED ORAL at 08:40

## 2021-04-22 RX ADMIN — TIOTROPIUM BROMIDE INHALATION SPRAY 2 PUFF: 3.12 SPRAY, METERED RESPIRATORY (INHALATION) at 08:16

## 2021-04-22 RX ADMIN — PIPERACILLIN SODIUM AND TAZOBACTAM SODIUM 3.38 G: 3; .375 INJECTION, POWDER, LYOPHILIZED, FOR SOLUTION INTRAVENOUS at 10:51

## 2021-04-22 RX ADMIN — HYDROCHLOROTHIAZIDE 25 MG: 25 TABLET ORAL at 08:40

## 2021-04-22 RX ADMIN — SOTALOL HYDROCHLORIDE 120 MG: 80 TABLET ORAL at 08:40

## 2021-04-22 RX ADMIN — Medication 10 ML: at 06:00

## 2021-04-22 RX ADMIN — MEXILETINE HYDROCHLORIDE 200 MG: 200 CAPSULE ORAL at 08:40

## 2021-04-22 RX ADMIN — PANTOPRAZOLE SODIUM 40 MG: 40 TABLET, DELAYED RELEASE ORAL at 06:30

## 2021-04-22 NOTE — PROGRESS NOTES
MSN, CM:  Patient to be discharged home today with [facility] services ordered. Patient agrees with this discharge plan and has met all milestones for this admission. [facility] to transport patient home.     Care Management Interventions  PCP Verified by CM: Yes(Dr. Evan Velásquez)  Mode of Transport at Discharge: BLS([facility])  Transition of Care Consult (CM Consult): [address]: Yes  Physical Therapy Consult: Yes  Occupational Therapy Consult: Yes  Current Support Network: Own Home  Confirm Follow Up Transport: Self  The Plan for Transition of Care is Related to the Following Treatment Goals : Home health to regain strength back to baseline  The Patient and/or Patient Representative was Provided with a Choice of Provider and Agrees with the Discharge Plan?: Yes  Name of the Patient Representative Who was Provided with a Choice of Provider and Agrees with the Discharge Plan: Mr. Tom Momin (patient)  Freedom of Choice List was Provided with Basic Dialogue that Supports the Patient's Individualized Plan of Care/Goals, Treatment Preferences and Shares the Quality Data Associated with the Providers?: Yes  Discharge Location  Discharge Placement: Home with home health

## 2021-04-22 NOTE — PROGRESS NOTES
Pt resting in bed comfortably at this time, alert and oriented times 4. No distress noted, respirations even and unlabored on room air. Pt denies pain at this time. Pt instructed to call for assistance if needed, call light in place, will continue to monitor.

## 2021-04-22 NOTE — PROGRESS NOTES
Oxygen Qualifier       Room air: SpO2 with O2 and liter flow   Resting SpO2  94%    Ambulating SpO2  94%        Completed by:    Cali Shabazz, RT

## 2021-04-22 NOTE — PROGRESS NOTES
Discharge instructions reviewed with patient. Prescription for augmentin placed in pt belonging bag along with other belongings. Pt made aware of bronch on 4/29 listed on discharge paperwork. IV's removed, cath tip intact. Pt to be picked up at 3:30 by ambulance.

## 2021-04-22 NOTE — PROGRESS NOTES
Peyton   Admission Date: 4/16/2021             Daily Progress Note: 4/22/2021    The patient's chart is reviewed and the patient is discussed with the staff. Pt is a 71 yo  male with a history of GOLD stage II COPD, chronic systolic heart failure, s/p AICD, HTN, former smoker (quit 2001), balance issues, CAD, s/p CABG, and hyperlipidmia. Pt presented to the ER on 4/16/21 with shortness of breath and cough 1 week after receiving COVID vaccine. Pt had a chest CT scan in the ER which revealed RUL consolidation with a 5cm area of superimposed cavitation, R hilar and mediastinal lymphadenopathy, and calcified pleural plaques on the L. He was started on Vanc and Zosyn in the ER and admitted by the hospitalist service for sepsis with PNA. Pt was placed on airborne precautions. His Quantiferon and aspergillus tests are pending. His AFB smear x 3 are negative.    Subjective:     Pt sitting on the side of the bed on RA. Pt reports coughing up yellow to off yellow phlegm. He does feel some dyspnea on exertion. He denies wheezing. We discussed possibility of him going home. Pt wants to go home but is nervous about ambulating. Pt is supposed to see him today.      Current Facility-Administered Medications   Medication Dose Route Frequency    [START ON 4/23/2021] Vancomycin Trough Reminder    Other ONCE    sodium chloride (OCEAN) 0.65 % nasal squeeze bottle 2 Spray  2 Spray Both Nostrils Q2H PRN    sodium chloride 3% hypertonic nebulizer soln  4 mL Nebulization BID PRN    nitroglycerin (NITROSTAT) tablet 0.4 mg  0.4 mg SubLINGual PRN    aspirin delayed-release tablet 81 mg  81 mg Oral DAILY    bisacodyL (DULCOLAX) tablet 5 mg  5 mg Oral DAILY PRN    cholecalciferol (VITAMIN D3) (1000 Units /25 mcg) tablet 2,000 Units  2,000 Units Oral DAILY    mexiletine (MEXITIL) capsule 200 mg (Patient Supplied)  200 mg Oral BID    fluticasone propionate (FLONASE) 50 mcg/actuation nasal spray 2 Spray  2 Spray Both Nostrils DAILY    carvediloL (COREG) tablet 12.5 mg  12.5 mg Oral BID    ezetimibe (ZETIA) tablet 10 mg  10 mg Oral DAILY    hydroCHLOROthiazide (HYDRODIURIL) tablet 25 mg  25 mg Oral DAILY    lisinopriL (PRINIVIL, ZESTRIL) tablet 5 mg  5 mg Oral DAILY    pantoprazole (PROTONIX) tablet 40 mg  40 mg Oral ACB    atorvastatin (LIPITOR) tablet 20 mg  20 mg Oral DAILY    sotaloL (BETAPACE) tablet 120 mg  120 mg Oral BID    ranolazine ER (RANEXA) tablet 500 mg  500 mg Oral BID    budesonide-formoteroL (SYMBICORT) 160-4.5 mcg/actuation HFA inhaler 2 Puff  2 Puff Inhalation BID RT    sodium chloride (NS) flush 5-40 mL  5-40 mL IntraVENous Q8H    sodium chloride (NS) flush 5-40 mL  5-40 mL IntraVENous PRN    acetaminophen (TYLENOL) tablet 650 mg  650 mg Oral Q6H PRN    Or    acetaminophen (TYLENOL) suppository 650 mg  650 mg Rectal Q6H PRN    polyethylene glycol (MIRALAX) packet 17 g  17 g Oral DAILY    senna-docusate (PERICOLACE) 8.6-50 mg per tablet 1 Tab  1 Tab Oral BID    magnesium hydroxide (MILK OF MAGNESIA) 400 mg/5 mL oral suspension 30 mL  30 mL Oral DAILY PRN    bisacodyL (DULCOLAX) suppository 10 mg  10 mg Rectal DAILY PRN    enoxaparin (LOVENOX) injection 40 mg  40 mg SubCUTAneous DAILY    tiotropium bromide (SPIRIVA RESPIMAT) 2.5 mcg /actuation  2 Puff Inhalation DAILY    piperacillin-tazobactam (ZOSYN) 3.375 g in 0.9% sodium chloride (MBP/ADV) 100 mL MBP  3.375 g IntraVENous Q8H    vancomycin (VANCOCIN) 1250 mg in  ml infusion  1,250 mg IntraVENous Q12H       Review of Systems  +cough-yellow phlegm  +dyspnea on exertion  Constitutional: negative for fever, chills, sweats  Cardiovascular: negative for chest pain, palpitations, syncope, edema  Gastrointestinal:  negative for dysphagia, reflux, vomiting, diarrhea, abdominal pain, or melena  Neurologic:  negative for focal weakness, numbness, headache    Objective:     Vitals:    04/21/21 2351 04/22/21 0406 04/22/21 0810 04/22/21 0816   BP: 123/71 123/72 130/79    Pulse: 80 81 79    Resp: 19 18 19    Temp: 97.6 °F (36.4 °C) 97.6 °F (36.4 °C) 97.6 °F (36.4 °C)    SpO2: 90% 90% 91% 92%   Weight:       Height:             Intake/Output Summary (Last 24 hours) at 4/22/2021 1050  Last data filed at 4/21/2021 1827  Gross per 24 hour   Intake 480 ml   Output    Net 480 ml       Physical Exam:   Constitution:  the patient is well developed and in no acute distress, on RA  EENMT:  Sclera clear, pupils equal, oral mucosa moist  Respiratory: bibasilar crackles  Cardiovascular:  RRR without M,G,R  Gastrointestinal: soft and non-tender; with positive bowel sounds. Musculoskeletal: warm without cyanosis. There is no lower extremity edema. Skin:  no jaundice or rashes  Neurologic: no gross neuro deficits     Psychiatric:  alert and oriented x 3    CXR:       LAB  No results for input(s): GLUCPOC in the last 72 hours. No lab exists for component: [place]   Recent Labs     04/21/21  0557 04/20/21  0944   WBC 13.3* 15.4*   HGB 9.3* 9.6*   HCT 28.3* 28.5*   * 663*     Recent Labs     04/21/21  0557 04/20/21  0944   * 131*   K 3.3* 3.3*   CL 98 98   CO2 25 25   GLU 98 120*   BUN 11 11   CREA 0.63* 0.73*   CA 8.3 8.4     No results for input(s): PH, PCO2, PO2, HCO3, PHI, PCO2I, PO2I, HCO3I in the last 72 hours. No results for input(s): LCAD, LAC in the last 72 hours.       Assessment:  (Medical Decision Making)     Hospital Problems  Date Reviewed: 4/22/2021          Codes Class Noted POA    Acute respiratory failure with hypoxia [facility]) ICD-10-CM: J96.01  ICD-9-CM: 518.81  4/20/2021 Unknown    Now on RA, PT to ambulate pt     * (Principal) Cavitary lung disease ICD-10-CM: J98.4  ICD-9-CM: 518.89  4/16/2021 Unknown    Change to Augmentin     GERD (gastroesophageal reflux disease) ICD-10-CM: K21.9  ICD-9-CM: 530.81  Unknown Yes    Chronic     Ischemic cardiomyopathy ICD-10-CM: I25.5  ICD-9-CM: 414.8  6/30/2017 Yes    Overview Signed 3/19/2020 10:22 AM by Carleen Bullock MD     Last Assessment & Plan:   He has no symptoms of volume overload. We will continue his current regimen. Follow-up in 6 months with an echocardiogram prior to his return. Systolic CHF, chronic (HCC) ICD-10-CM: I50.22  ICD-9-CM: 428.22, 428.0  11/15/2011 Yes              Plan:  (Medical Decision Making)     --on RA, RT or PT to check ambulating sats today  --change Zosyn to Augmentin PO BID x 5 additional weeks  --pt scheduled for navigational bronch with [facility] on 4/29 with Dr. Nikunj Downs. Pt to get COVID tested today before he leaves for planned procedure next week. --ok to discharge home, we will sign off. Please call with questions. More than 50% of the time documented was spent in face-to-face contact with the patient and in the care of the patient on the floor/unit where the patient is located. COLEEN Roa      The patient has been seen and examined by me personally, the chart,labs, and radiographic studies have been reviewed. Chest:CTA  Extremities: trace edema    I agree with the above assessment and plan.     Aurelia Syed MD.

## 2021-04-22 NOTE — DISCHARGE SUMMARY
[facility] Hospitalist Discharge Summary     Name:  Bethany Roth    Age:70 y.o. Sex:male  :  1950       MRN:  [id number]       Admitting Physician: Joey Moran DO Admit Date: 2021  5:37 PM   Attending Physician: Anayeli Lagos DO  Primary Care Physician: Castro Oliver MD       Discharge Physician: Eliseo Kohler NP  Discharge date: 21   Discharged Condition: Stable    Indication for Admission:   Chief Complaint   Patient presents with    Fatigue        Reasons for hospitalization:  Hospital Problems as of 2021 Date Reviewed: 2021          Codes Class Noted - Resolved POA    Acute respiratory failure with hypoxia [facility]) ICD-10-CM: J96.01  ICD-9-CM: 518.81  2021 - Present Unknown        * (Principal) Cavitary lung disease ICD-10-CM: J98.4  ICD-9-CM: 518.89  2021 - Present Unknown        GERD (gastroesophageal reflux disease) ICD-10-CM: K21.9  ICD-9-CM: 530.81  Unknown - Present Yes        Ischemic cardiomyopathy ICD-10-CM: I25.5  ICD-9-CM: 414.8  2017 - Present Yes    Overview Signed 3/19/2020 10:22 AM by Castro Oliver MD     Last Assessment & Plan:   He has no symptoms of volume overload. We will continue his current regimen. Follow-up in 6 months with an echocardiogram prior to his return. Systolic CHF, chronic (HCC) ICD-10-CM: I50.22  ICD-9-CM: 428.22, 428.0  11/15/2011 - Present Yes                 Discharge Diagnosis: Severe sepsis, R upper lobe pulmonary cavitation 2/2 pna   Did Patient have Sepsis (YES OR NO): YES      Hospital Course/Interval history:  Patient is a 72 y. o. male with medical history of chronic systolic heart failure, status post AICD, COPD, hypertensions, CAD, status post CABG, hyperlipidemia presented to the ED with worsening shortness of breath and cough. Chest CT scan showed right upper lobe consolidation with a 5 cm area of superimposed cavitation. Patient has received Covid vaccine approximately 1 week ago. He was admitted with severe sepsis secondary to suspected bacterial pneumonia with elevated procalcitonin. He was started on vancomycin and Zosyn. Tuberculosis and primary malignancy with cavitation in differential.  Pulmonology consulted. AFB culture x3  Negative. QuantiFERON and Aspergillus tests are pending. Patient is scheduled for outpatient navigational [facility] with EBUS on 4/29/2021 with Dr. Marye Sicard for possible NSCLC staging. Apr/22: No AEO. Patient in NAD. No new complaints today. 94% saturation on room air during 6 minute walk test.  CM to set up [facility] services. Pulmonology saw patient today; Mr. Carmen Martinez is appropriate for discharge today. Assessment/Plan:  Severe sepsis secondary to suspected bacterial pneumonia:  Leukocytosis improving  CT scan with right upper lobe consolidation with a 5 cm area of superimposed cavitation.   Tuberculosis and primary malignancy with cavitation and differential  AFB x3  Negative, airborne precautions discontinued  Follow-up QuantiFERON, Aspergillus Ag  Pulmonology consulted and recommend OP navigational bronchoscopy with biopsy and staging EBUS on 4/29/2021  Continue vancomycin and Zosyn, switch to orals tomorrow  Blood cultures till date daily negative  Apr/22: Blood cx from 4/16 and 4/17 negative   - Stop vanc and Zosyn   - Start Augmentin x 5 weeks per Pulmonology   - Follow up with Pulmonology outpatient next week on 4/29.     CAD/hypertension/hyperlipidemia/chronic systolic heart failure/post AICD:  Stable  Continue home meds aspirin, atorvastatin, carvedilol, ezetimibe, hydrochlorothiazide, lisinopril     COPD:  Continue nebs treatment  Continue Symbicort and Spiriva  Apr/22: Patient 94% on room air at rest and while ambulating x 6 minutes    Consults:   IP CONSULT TO PULMONOLOGY     Disposition: Home Health Care Svc  Diet:   DIET MECHANICAL SOFT  Code Status: Full Code      Follow up labs/imaging: Attend your navigational bronch with EBUS on April/29 with Dr. Redd Vega [phone]. Follow up with your PCP within 3 days. Current Discharge Medication List      START taking these medications    Details   amoxicillin-clavulanate (AUGMENTIN) 875-125 mg per tablet Take 1 Tab by mouth every twelve (12) hours every twelve (12) hours for 35 days. Indications: cavitary lesion  Qty: 70 Tab, Refills: 0         CONTINUE these medications which have NOT CHANGED    Details   tiotropium (Spiriva with HandiHaler) 18 mcg inhalation capsule INHALE THE CONTENTS OF 1 CAPSULE DAILY  Qty: 90 Cap, Refills: 4      fluticasone propion-salmeteroL (Advair Diskus) 250-50 mcg/dose diskus inhaler USE 1 INHALATION TWICE A DAY IN THE MORNING AND EVENING APPROXIMATELY 12 HOURS APART  Qty: 180 Each, Refills: 4      carvediloL (COREG) 12.5 mg tablet Take 1 Tab by mouth two (2) times a day. Qty: 180 Tab, Refills: 3    Associated Diagnoses: Paroxysmal ventricular tachycardia (HCC)      ezetimibe (ZETIA) 10 mg tablet TAKE 1 TABLET DAILY  Qty: 90 Tab, Refills: 3    Associated Diagnoses: Pure hypercholesterolemia      hydroCHLOROthiazide (HYDRODIURIL) 25 mg tablet Take 1 Tab by mouth daily. Qty: 90 Tab, Refills: 3    Associated Diagnoses: Essential hypertension, benign      lisinopriL (PRINIVIL, ZESTRIL) 5 mg tablet Take 1 Tab by mouth daily. Qty: 90 Tab, Refills: 3    Associated Diagnoses: Essential hypertension, benign      pantoprazole (PROTONIX) 40 mg tablet TAKE 1 TABLET DAILY  Qty: 90 Tab, Refills: 3    Associated Diagnoses: Essential hypertension      simvastatin (ZOCOR) 40 mg tablet TAKE 1 TABLET NIGHTLY  Qty: 90 Tab, Refills: 3    Associated Diagnoses: Pure hypercholesterolemia      sotaloL (BETAPACE) 120 mg tablet Take 1 Tab by mouth two (2) times a day. Qty: 180 Tab, Refills: 3    Associated Diagnoses: Paroxysmal ventricular tachycardia (HCC)      ranolazine ER (Ranexa) 500 mg SR tablet Take 1 Tab by mouth two (2) times a day.   Qty: 180 Tab, Refills: 3    Associated Diagnoses: Paroxysmal ventricular tachycardia (HCC)      fluticasone propionate (FLONASE) 50 mcg/actuation nasal spray 2 Sprays by Both Nostrils route daily. Qty: 1 Bottle, Refills: 5    Associated Diagnoses: Allergic rhinitis, unspecified seasonality, unspecified trigger      mexiletine (MEXITIL) 200 mg capsule Take 1 Cap by mouth two (2) times a day. Qty: 180 Cap, Refills: 3      B.infantis-B.ani-B.long-B.bifi (Probiotic 4X) 10-15 mg TbEC Take  by mouth.      bisacodyL (Dulcolax, bisacodyl,) 5 mg EC tablet Take 5 mg by mouth daily as needed for Constipation. cholecalciferol (Vitamin D3) 25 mcg (1,000 unit) cap Take  by mouth daily. aspirin delayed-release 81 mg tablet Take 81 mg by mouth daily. multivitamin (ONE A DAY) tablet Take 1 Tab by mouth daily. nitroglycerin (NITROSTAT) 0.4 mg SL tablet by SubLINGual route every five (5) minutes as needed. Medications Discontinued During This Encounter   Medication Reason    piperacillin-tazobactam (ZOSYN) 3.375 g in 0.9% sodium chloride (MBP/ADV) 100 mL MBP     0.9% sodium chloride infusion     tiotropium bromide (SPIRIVA RESPIMAT) 1.25 mcg/ actuation REORDER    piperacillin-tazobactam (ZOSYN) 3.375 g in 0.9% sodium chloride (MBP/ADV) 100 mL MBP Alternate Therapy         Follow Up Orders: Follow-up Appointments   Procedures    FOLLOW UP VISIT Appointment in: Other (Specify)     Standing Status:   Standing     Number of Occurrences:   1     Order Specific Question:   Appointment in     Answer: Other (Specify)         Follow-up Information     Follow up With Specialties Details Why Contact Info    Nehemiah Mccloud MD Family Medicine In 3 days post hospital discharge follow up [id number] [employer]  [phone]      Venancio Banegas MD Pulmonary Disease  Attend your navigational bronch with LURDES on Thursday, April/29 with Dr. Roly Macias.  [address] [facility] [address] [phone] Discharge Exam:    Patient Vitals for the past 24 hrs:   Temp Pulse Resp BP SpO2   04/22/21 1144 98.4 °F (36.9 °C) 75 17 120/74 92 %   04/22/21 0816     92 %   04/22/21 0810 97.6 °F (36.4 °C) 79 19 130/79 91 %   04/22/21 0406 97.6 °F (36.4 °C) 81 18 123/72 90 %   04/21/21 2351 97.6 °F (36.4 °C) 80 19 123/71 90 %   04/21/21 1955     92 %   04/21/21 1939 98 °F (36.7 °C) 81 19 124/72 90 %   04/21/21 1555 98.1 °F (36.7 °C) 75 18 115/69 90 %     Oxygen Therapy  O2 Sat (%): 92 % (04/22/21 1144)  Pulse via Oximetry: 83 beats per minute (04/22/21 0816)  O2 Device: None (Room air) (04/22/21 0816)  Skin Assessment: Clean, dry, & intact (04/21/21 1955)  Skin Protection for O2 Device: No (04/20/21 2016)  O2 Flow Rate (L/min): 0 l/min (04/21/21 0757)  FIO2 (%): 21 % (04/21/21 0757)    Estimated body mass index is 26.46 kg/m² as calculated from the following:    Height as of this encounter: 5' 9\" (1.753 m). Weight as of this encounter: 81.3 kg (179 lb 3.2 oz). Intake/Output Summary (Last 24 hours) at 4/22/2021 1401  Last data filed at 4/21/2021 1827  Gross per 24 hour   Intake 480 ml   Output    Net 480 ml       *Note that automatically entered I/Os may not be accurate; dependent on patient compliance with collection and accurate  by assistants.     Physical Exam:   General:  No acute distress, speaking in full sentences, no use of accessory muscles   HEENT:  PERRL, oropharynx is clear   Neck:   Supple, no JVD   Lungs:  No tachypnea, fine base crackles  CV:  Regular rate and rhythm with normal S1 and S2   Abdomen: Soft, nontender, nondistended, normoactive bowel sounds   Extremities:  No cyanosis clubbing or edema   Neuro:   Nonfocal, A&O x3   Psych:  Flat      All Labs from Last 24 Hrs:  Recent Results (from the past 24 hour(s))   CBC WITH AUTOMATED DIFF    Collection Time: 04/22/21 10:33 AM   Result Value Ref Range    WBC 13.3 (H) 4.3 - 11.1 K/uL    RBC 3.52 (L) 4.23 - 5.6 M/uL    HGB 10.1 (L) 13.6 - 17.2 g/dL    HCT 30.9 (L) 41.1 - 50.3 %    MCV 87.8 79.6 - 97.8 FL    MCH 28.7 26.1 - 32.9 PG    MCHC 32.7 31.4 - 35.0 g/dL    RDW 14.0 11.9 - 14.6 %    PLATELET 330 (H) 204 - 450 K/uL    MPV 8.5 (L) 9.4 - 12.3 FL    ABSOLUTE NRBC 0.00 0.0 - 0.2 K/uL    DF AUTOMATED      NEUTROPHILS 83 (H) 43 - 78 %    LYMPHOCYTES 9 (L) 13 - 44 %    MONOCYTES 6 4.0 - 12.0 %    EOSINOPHILS 1 0.5 - 7.8 %    BASOPHILS 0 0.0 - 2.0 %    IMMATURE GRANULOCYTES 1 0.0 - 5.0 %    ABS. NEUTROPHILS 11.1 (H) 1.7 - 8.2 K/UL    ABS. LYMPHOCYTES 1.2 0.5 - 4.6 K/UL    ABS. MONOCYTES 0.8 0.1 - 1.3 K/UL    ABS. EOSINOPHILS 0.1 0.0 - 0.8 K/UL    ABS. BASOPHILS 0.0 0.0 - 0.2 K/UL    ABS. IMM. GRANS. 0.1 0.0 - 0.5 K/UL   METABOLIC PANEL, BASIC    Collection Time: 04/22/21 10:33 AM   Result Value Ref Range    Sodium 132 (L) 138 - 145 mmol/L    Potassium 3.7 3.5 - 5.1 mmol/L    Chloride 99 98 - 107 mmol/L    CO2 26 21 - 32 mmol/L    Anion gap 7 7 - 16 mmol/L    Glucose 109 (H) 65 - 100 mg/dL    BUN 9 8 - 23 MG/DL    Creatinine 0.82 0.8 - 1.5 MG/DL    GFR est AA >60 >60 ml/min/1.73m2    GFR est non-AA >60 >60 ml/min/1.73m2    Calcium 8.6 8.3 - 10.4 MG/DL   COVID-19 RAPID TEST    Collection Time: 04/22/21 11:30 AM   Result Value Ref Range    Specimen source NASAL      COVID-19 rapid test Not detected NOTD         All Micro Results     Procedure Component Value Units Date/Time    QUANTIFERON-TB GOLD PLUS [[id number]] Collected: 04/19/21 1841    Order Status: Completed Specimen: Blood from Serum Updated: 04/22/21 1236     QuantiFERON Incubation Incubation performed. QuantiFERON Plus Incubation performed.      Comment: (NOTE)  Chemiluminescence immunoassay methodology  Performed At: [address] [id number]  Jacob Corral MD ZX:[id number]         BDHUG-12 RAPID TEST [[id number]] Collected: 04/22/21 1130    Order Status: Completed Specimen: Nasopharyngeal Updated: 04/22/21 1230     Specimen source NASAL COVID-19 rapid test Not detected        Comment:      The specimen is NEGATIVE for SARS-CoV-2, the novel coronavirus associated with COVID-19. A negative result does not rule out COVID-19. This test has been authorized by the FDA under an Emergency Use Authorization (EUA) for use by authorized laboratories. Fact sheet for Healthcare Providers: [employer]  Fact sheet for Patients: [employer]       Methodology: Isothermal Nucleic Acid Amplification         CULTURE, BLOOD [[id number]] Collected: 04/17/21 0832    Order Status: Completed Specimen: Blood Updated: 04/22/21 0703     Special Requests: --        LEFT  HAND       Culture result: NO GROWTH 5 DAYS       CULTURE, BLOOD [[id number]] Collected: 04/16/21 1820    Order Status: Completed Specimen: Blood Updated: 04/22/21 0703     Special Requests: --        NO SPECIAL REQUESTS  RIGHT  ARM       Culture result: NO GROWTH 6 DAYS       AFB CULTURE + SMEAR W/RFLX ID FROM CULTURE [[id number]] Collected: 04/18/21 1510    Order Status: Completed Specimen: Miscellaneous sample Updated: 04/20/21 1636     Source EXPECTORATED SPUTUM        AFB Specimen processing Concentration     Acid Fast Smear Negative        Comment: (NOTE)  Performed At: [address] [id number]  Martin Altamirano MD RY:[id number]          Acid Fast Culture PENDING    AFB CULTURE + SMEAR W/RFLX ID FROM CULTURE [[id number]] Collected: 04/18/21 1814    Order Status: Completed Specimen: Miscellaneous sample Updated: 04/20/21 1636     Source EXPECTORATED SPUTUM        Comment: oral  Corrected on 04/18 AT 1815:  This is a correction to the medical record of the test results previously reported as EXPECTORATED SPUTUM          AFB Specimen processing Concentration     Acid Fast Smear Negative        Comment: (NOTE)  Performed At: [facility]  [address] [employer], [address]  Sasha Flores Rebekah Marroquin MD ZV:[id number]          Acid Fast Culture PENDING    AFB CULTURE + SMEAR W/RFLX ID FROM CULTURE [[id number]] Collected: 04/18/21 2032    Order Status: Completed Specimen: Miscellaneous sample Updated: 04/20/21 1636     Source EXPECTORATED SPUTUM        AFB Specimen processing Concentration     Acid Fast Smear Negative        Comment: (NOTE)  Performed At: [address] [id number]  Francisco Cary MD TW:[id number]          Acid Fast Culture PENDING          SARS-CoV-2 Lab Results  \"Novel Coronavirus\" Test: No results found for: COV2NT   \"Emergent Disease\" Test: No results found for: EDPR  \"SARS-COV-2\" Test: No results found for: XGCOVT  Rapid Test:   Lab Results   Component Value Date/Time    COVR Not detected 04/22/2021 11:30 AM            Diagnostic Imaging/Tests:   Ct Chest W Cont    Result Date: 4/16/2021  1. Large area of consolidation involving the posterior right upper lobe with a 5 cm area of superimposed cavitation. There is a may represent pneumonia with necrotic cavitation as well as primary malignancy with cavitation. 2. Right hilar and mediastinal adenopathy, indeterminate for reactive or malignant. 3. Calcified pleural plaques on the left     Xr Chest Port    Result Date: 4/16/2021  New dense airspace consolidation in the right upper lobe concerning for pneumonia. Echocardiogram/EKG results:  No results found for this visit on 04/16/21. EKG Results     Procedure 720 Value Units Date/Time    EKG [[id number]] Collected: 04/16/21 1558    Order Status: Completed Updated: 04/17/21 1220     Ventricular Rate 89 BPM      Atrial Rate 89 BPM      P-R Interval 168 ms      QRS Duration 122 ms      Q-T Interval 434 ms      QTC Calculation (Bezet) 528 ms      Calculated P Axis 68 degrees      Calculated R Axis -63 degrees      Calculated T Axis 88 degrees      Diagnosis --     A sensing V pacing.    Abnormal ECG  When compared with ECG of 22-JUL-2016 13:49,  Premature ventricular complexes are no longer Present  Left anterior fascicular block is now Present  Minimal criteria for Anterior infarct are now Present  QT has lengthened  Confirmed by Rosalba Orozco ([id number]) on 4/17/2021 12:20:16 PM            Results for orders placed or performed during the hospital encounter of 04/16/21   EKG, 12 LEAD, INITIAL   Result Value Ref Range    Ventricular Rate 89 BPM    Atrial Rate 89 BPM    P-R Interval 168 ms    QRS Duration 122 ms    Q-T Interval 434 ms    QTC Calculation (Bezet) 528 ms    Calculated P Axis 68 degrees    Calculated R Axis -63 degrees    Calculated T Axis 88 degrees    Diagnosis       A sensing V pacing. Abnormal ECG  When compared with ECG of 22-JUL-2016 13:49,  Premature ventricular complexes are no longer Present  Left anterior fascicular block is now Present  Minimal criteria for Anterior infarct are now Present  QT has lengthened  Confirmed by Rosalba Orozco ([id number]) on 4/17/2021 12:20:16 PM         Procedures done this admission:  * No surgery found *    All VS, labs, studies, and documentation reviewed and discussed. Time spent in patient discharge planning and coordination 34 minutes.       Signed By: Steven Jaffe NP   [address] [place], [place] [address]ist Service    April 22, 2021  2:01 PM

## 2021-04-22 NOTE — PROGRESS NOTES
No acute events overnight. Patient resting quietly in bed. Respirations present, even and unlabored on room air. Bed low and locked, safety measures in place. No signs of distress, no needs expressed by the patient. Call light within reach, encouraged patient to call with any needs. Preparing to give report to oncoming RN.

## 2021-04-22 NOTE — DISCHARGE INSTRUCTIONS
Patient Education        Learning About Hypoxemia  What is hypoxemia? Hypoxemia means that you don't have enough oxygen in your blood. It's a result of diseases that affect your heart or lungs. These include heart failure, COPD, and pulmonary fibrosis (scarring of the lungs). Being at high altitudes can also lead to hypoxemia. What happens when you have hypoxemia? Oxygen gets into your blood through your lungs. Your blood carries the oxygen to all parts of your body. When you have too little oxygen in your blood, your body doesn't get enough of it. With too little oxygen, your heart and other parts of your body don't work very well. What are the symptoms? In addition to the symptoms of whatever is causing your hypoxemia, you may:  · Get tired quickly. · Be short of breath when you are active. · Feel like your heart is pounding or racing. · Feel weak or dizzy. · Become confused. How is hypoxemia treated? Your doctor will do tests to find out how much oxygen is in your blood. He or she will look for the cause of your hypoxemia and treat that problem. For example, if you have heart failure, you may need medicines that help your heart pump better. · If your hypoxemia is not severe, your doctor may give you oxygen through a mask or nasal cannula (say \"CHRISTIAN-dileeph-augustus\"). A cannula is a thin tube with two openings that fit just inside your nose. · If your hypoxemia is severe, you may have a breathing tube put into your windpipe. The breathing tube is attached to a machine that pushes air into your lungs. This machine is called a ventilator. · If you have a long-term problem with hypoxemia, your doctor may recommend that you use oxygen regularly. Some people need it all the time. Others need it from time to time throughout the day or overnight. Your doctor will tell you how much oxygen you need and how often to use it. Follow-up care is a key part of your treatment and safety.  Be sure to make and go to all appointments, and call your doctor if you are having problems. It's also a good idea to know your test results and keep a list of the medicines you take. Where can you learn more? Go to http://www.gray.com/  Enter M375 in the search box to learn more about \"Learning About Hypoxemia. \"  Current as of: October 26, 2020               Content Version: 12.8  © 2006-2021 [employer]. Care instructions adapted under license by [employer] (which disclaims liability or warranty for this information). If you have questions about a medical condition or this instruction, always ask your healthcare professional. [address] any warranty or liability for your use of this information.

## 2021-04-24 ENCOUNTER — HOME CARE VISIT (OUTPATIENT)
Dept: SCHEDULING | Facility: HOME HEALTH | Age: 71
End: 2021-04-24
Payer: MEDICARE

## 2021-04-24 PROCEDURE — 3331090001 HH PPS REVENUE CREDIT

## 2021-04-24 PROCEDURE — G0299 HHS/HOSPICE OF RN EA 15 MIN: HCPCS

## 2021-04-24 PROCEDURE — 3331090002 HH PPS REVENUE DEBIT

## 2021-04-24 PROCEDURE — 400013 HH SOC

## 2021-04-24 PROCEDURE — 400018 HH-NO PAY CLAIM PROCEDURE

## 2021-04-25 VITALS
DIASTOLIC BLOOD PRESSURE: 62 MMHG | TEMPERATURE: 96.6 F | RESPIRATION RATE: 12 BRPM | OXYGEN SATURATION: 95 % | SYSTOLIC BLOOD PRESSURE: 110 MMHG | HEART RATE: 88 BPM

## 2021-04-25 PROCEDURE — 3331090002 HH PPS REVENUE DEBIT

## 2021-04-25 PROCEDURE — 3331090001 HH PPS REVENUE CREDIT

## 2021-04-26 ENCOUNTER — HOME CARE VISIT (OUTPATIENT)
Dept: SCHEDULING | Facility: HOME HEALTH | Age: 71
End: 2021-04-26
Payer: MEDICARE

## 2021-04-26 PROCEDURE — G0299 HHS/HOSPICE OF RN EA 15 MIN: HCPCS

## 2021-04-26 PROCEDURE — 3331090001 HH PPS REVENUE CREDIT

## 2021-04-26 PROCEDURE — 3331090002 HH PPS REVENUE DEBIT

## 2021-04-27 ENCOUNTER — HOME CARE VISIT (OUTPATIENT)
Dept: SCHEDULING | Facility: HOME HEALTH | Age: 71
End: 2021-04-27
Payer: MEDICARE

## 2021-04-27 VITALS
SYSTOLIC BLOOD PRESSURE: 106 MMHG | HEART RATE: 66 BPM | TEMPERATURE: 98.1 F | RESPIRATION RATE: 18 BRPM | DIASTOLIC BLOOD PRESSURE: 64 MMHG

## 2021-04-27 VITALS
HEART RATE: 68 BPM | DIASTOLIC BLOOD PRESSURE: 60 MMHG | OXYGEN SATURATION: 96 % | TEMPERATURE: 98.2 F | RESPIRATION RATE: 16 BRPM | SYSTOLIC BLOOD PRESSURE: 106 MMHG

## 2021-04-27 PROCEDURE — 3331090001 HH PPS REVENUE CREDIT

## 2021-04-27 PROCEDURE — 3331090002 HH PPS REVENUE DEBIT

## 2021-04-27 PROCEDURE — G0151 HHCP-SERV OF PT,EA 15 MIN: HCPCS

## 2021-04-28 ENCOUNTER — HOME CARE VISIT (OUTPATIENT)
Dept: SCHEDULING | Facility: HOME HEALTH | Age: 71
End: 2021-04-28
Payer: MEDICARE

## 2021-04-28 VITALS
RESPIRATION RATE: 16 BRPM | DIASTOLIC BLOOD PRESSURE: 68 MMHG | SYSTOLIC BLOOD PRESSURE: 100 MMHG | HEART RATE: 80 BPM | TEMPERATURE: 97.4 F | OXYGEN SATURATION: 98 %

## 2021-04-28 PROCEDURE — G0152 HHCP-SERV OF OT,EA 15 MIN: HCPCS

## 2021-04-28 PROCEDURE — 3331090002 HH PPS REVENUE DEBIT

## 2021-04-28 PROCEDURE — 3331090001 HH PPS REVENUE CREDIT

## 2021-04-29 PROCEDURE — 3331090002 HH PPS REVENUE DEBIT

## 2021-04-29 PROCEDURE — 3331090001 HH PPS REVENUE CREDIT

## 2021-04-30 ENCOUNTER — HOME CARE VISIT (OUTPATIENT)
Dept: SCHEDULING | Facility: HOME HEALTH | Age: 71
End: 2021-04-30
Payer: MEDICARE

## 2021-04-30 PROCEDURE — G0158 HHC OT ASSISTANT EA 15: HCPCS

## 2021-04-30 PROCEDURE — 3331090002 HH PPS REVENUE DEBIT

## 2021-04-30 PROCEDURE — 3331090001 HH PPS REVENUE CREDIT

## 2021-05-01 PROCEDURE — 3331090001 HH PPS REVENUE CREDIT

## 2021-05-01 PROCEDURE — 3331090002 HH PPS REVENUE DEBIT

## 2021-05-02 VITALS
OXYGEN SATURATION: 97 % | HEART RATE: 69 BPM | DIASTOLIC BLOOD PRESSURE: 65 MMHG | SYSTOLIC BLOOD PRESSURE: 110 MMHG | TEMPERATURE: 98 F | RESPIRATION RATE: 17 BRPM

## 2021-05-02 PROCEDURE — 3331090002 HH PPS REVENUE DEBIT

## 2021-05-02 PROCEDURE — 3331090001 HH PPS REVENUE CREDIT

## 2021-05-03 PROCEDURE — 3331090002 HH PPS REVENUE DEBIT

## 2021-05-03 PROCEDURE — 3331090001 HH PPS REVENUE CREDIT

## 2021-05-04 ENCOUNTER — HOME CARE VISIT (OUTPATIENT)
Dept: SCHEDULING | Facility: HOME HEALTH | Age: 71
End: 2021-05-04
Payer: MEDICARE

## 2021-05-04 ENCOUNTER — HOME CARE VISIT (OUTPATIENT)
Dept: HOME HEALTH SERVICES | Facility: HOME HEALTH | Age: 71
End: 2021-05-04
Payer: MEDICARE

## 2021-05-04 VITALS
HEART RATE: 77 BPM | TEMPERATURE: 98.5 F | SYSTOLIC BLOOD PRESSURE: 102 MMHG | DIASTOLIC BLOOD PRESSURE: 60 MMHG | OXYGEN SATURATION: 96 % | RESPIRATION RATE: 18 BRPM

## 2021-05-04 PROCEDURE — 3331090001 HH PPS REVENUE CREDIT

## 2021-05-04 PROCEDURE — G0299 HHS/HOSPICE OF RN EA 15 MIN: HCPCS

## 2021-05-04 PROCEDURE — 3331090002 HH PPS REVENUE DEBIT

## 2021-05-05 ENCOUNTER — HOME CARE VISIT (OUTPATIENT)
Dept: SCHEDULING | Facility: HOME HEALTH | Age: 71
End: 2021-05-05
Payer: MEDICARE

## 2021-05-05 VITALS
HEART RATE: 72 BPM | DIASTOLIC BLOOD PRESSURE: 64 MMHG | SYSTOLIC BLOOD PRESSURE: 122 MMHG | TEMPERATURE: 98.2 F | OXYGEN SATURATION: 98 % | RESPIRATION RATE: 17 BRPM

## 2021-05-05 VITALS
SYSTOLIC BLOOD PRESSURE: 122 MMHG | RESPIRATION RATE: 18 BRPM | DIASTOLIC BLOOD PRESSURE: 64 MMHG | HEART RATE: 72 BPM | TEMPERATURE: 98.2 F

## 2021-05-05 PROCEDURE — 3331090001 HH PPS REVENUE CREDIT

## 2021-05-05 PROCEDURE — G0151 HHCP-SERV OF PT,EA 15 MIN: HCPCS

## 2021-05-05 PROCEDURE — 3331090002 HH PPS REVENUE DEBIT

## 2021-05-05 PROCEDURE — G0158 HHC OT ASSISTANT EA 15: HCPCS

## 2021-05-06 PROCEDURE — 3331090002 HH PPS REVENUE DEBIT

## 2021-05-06 PROCEDURE — 3331090001 HH PPS REVENUE CREDIT

## 2021-05-07 ENCOUNTER — HOME CARE VISIT (OUTPATIENT)
Dept: SCHEDULING | Facility: HOME HEALTH | Age: 71
End: 2021-05-07
Payer: MEDICARE

## 2021-05-07 PROCEDURE — 3331090001 HH PPS REVENUE CREDIT

## 2021-05-07 PROCEDURE — G0299 HHS/HOSPICE OF RN EA 15 MIN: HCPCS

## 2021-05-07 PROCEDURE — G0158 HHC OT ASSISTANT EA 15: HCPCS

## 2021-05-07 PROCEDURE — 3331090002 HH PPS REVENUE DEBIT

## 2021-05-08 PROCEDURE — 3331090001 HH PPS REVENUE CREDIT

## 2021-05-08 PROCEDURE — 3331090002 HH PPS REVENUE DEBIT

## 2021-05-09 VITALS
TEMPERATURE: 98 F | HEART RATE: 83 BPM | RESPIRATION RATE: 16 BRPM | OXYGEN SATURATION: 97 % | SYSTOLIC BLOOD PRESSURE: 125 MMHG | DIASTOLIC BLOOD PRESSURE: 84 MMHG

## 2021-05-09 PROCEDURE — 3331090002 HH PPS REVENUE DEBIT

## 2021-05-09 PROCEDURE — 3331090001 HH PPS REVENUE CREDIT

## 2021-05-10 VITALS
SYSTOLIC BLOOD PRESSURE: 112 MMHG | TEMPERATURE: 98.3 F | OXYGEN SATURATION: 98 % | HEART RATE: 78 BPM | RESPIRATION RATE: 16 BRPM | DIASTOLIC BLOOD PRESSURE: 68 MMHG

## 2021-05-10 PROCEDURE — 3331090002 HH PPS REVENUE DEBIT

## 2021-05-10 PROCEDURE — 3331090001 HH PPS REVENUE CREDIT

## 2021-05-11 ENCOUNTER — HOME CARE VISIT (OUTPATIENT)
Dept: SCHEDULING | Facility: HOME HEALTH | Age: 71
End: 2021-05-11
Payer: MEDICARE

## 2021-05-11 VITALS
DIASTOLIC BLOOD PRESSURE: 60 MMHG | RESPIRATION RATE: 16 BRPM | HEART RATE: 82 BPM | OXYGEN SATURATION: 97 % | SYSTOLIC BLOOD PRESSURE: 106 MMHG | TEMPERATURE: 97.8 F

## 2021-05-11 VITALS
DIASTOLIC BLOOD PRESSURE: 72 MMHG | SYSTOLIC BLOOD PRESSURE: 115 MMHG | RESPIRATION RATE: 17 BRPM | HEART RATE: 72 BPM | TEMPERATURE: 97.6 F | OXYGEN SATURATION: 98 %

## 2021-05-11 PROCEDURE — 3331090002 HH PPS REVENUE DEBIT

## 2021-05-11 PROCEDURE — G0299 HHS/HOSPICE OF RN EA 15 MIN: HCPCS

## 2021-05-11 PROCEDURE — 3331090001 HH PPS REVENUE CREDIT

## 2021-05-11 PROCEDURE — G0152 HHCP-SERV OF OT,EA 15 MIN: HCPCS

## 2021-05-11 PROCEDURE — G0157 HHC PT ASSISTANT EA 15: HCPCS

## 2021-05-12 VITALS
HEART RATE: 74 BPM | SYSTOLIC BLOOD PRESSURE: 106 MMHG | OXYGEN SATURATION: 98 % | DIASTOLIC BLOOD PRESSURE: 64 MMHG | RESPIRATION RATE: 18 BRPM | TEMPERATURE: 97.1 F

## 2021-05-12 PROCEDURE — 3331090001 HH PPS REVENUE CREDIT

## 2021-05-12 PROCEDURE — 3331090002 HH PPS REVENUE DEBIT

## 2021-05-13 ENCOUNTER — HOME CARE VISIT (OUTPATIENT)
Dept: SCHEDULING | Facility: HOME HEALTH | Age: 71
End: 2021-05-13
Payer: MEDICARE

## 2021-05-13 VITALS
DIASTOLIC BLOOD PRESSURE: 64 MMHG | HEART RATE: 75 BPM | SYSTOLIC BLOOD PRESSURE: 108 MMHG | RESPIRATION RATE: 18 BRPM | OXYGEN SATURATION: 98 % | TEMPERATURE: 97.5 F

## 2021-05-13 PROCEDURE — 3331090001 HH PPS REVENUE CREDIT

## 2021-05-13 PROCEDURE — G0157 HHC PT ASSISTANT EA 15: HCPCS

## 2021-05-13 PROCEDURE — 3331090002 HH PPS REVENUE DEBIT

## 2021-05-14 PROCEDURE — 3331090001 HH PPS REVENUE CREDIT

## 2021-05-14 PROCEDURE — 3331090002 HH PPS REVENUE DEBIT

## 2021-05-15 PROCEDURE — 3331090002 HH PPS REVENUE DEBIT

## 2021-05-15 PROCEDURE — 3331090001 HH PPS REVENUE CREDIT

## 2021-05-16 PROCEDURE — 3331090001 HH PPS REVENUE CREDIT

## 2021-05-16 PROCEDURE — 3331090002 HH PPS REVENUE DEBIT

## 2021-05-17 ENCOUNTER — HOME CARE VISIT (OUTPATIENT)
Dept: SCHEDULING | Facility: HOME HEALTH | Age: 71
End: 2021-05-17
Payer: MEDICARE

## 2021-05-17 VITALS
SYSTOLIC BLOOD PRESSURE: 130 MMHG | DIASTOLIC BLOOD PRESSURE: 70 MMHG | OXYGEN SATURATION: 97 % | RESPIRATION RATE: 18 BRPM | TEMPERATURE: 98 F | HEART RATE: 74 BPM

## 2021-05-17 PROCEDURE — G0157 HHC PT ASSISTANT EA 15: HCPCS

## 2021-05-17 PROCEDURE — 3331090001 HH PPS REVENUE CREDIT

## 2021-05-17 PROCEDURE — 3331090002 HH PPS REVENUE DEBIT

## 2021-05-18 LAB
M AVIUM CMPLX RRNA SPEC QL PROBE: POSITIVE
M GORDONAE RRNA SPEC QL PROBE: ABNORMAL
M KANSASII RRNA SPEC QL PROBE: ABNORMAL
M TB CMPLX RRNA SPEC QL PROBE: NEGATIVE
OTHER, RAFBI6: ABNORMAL
SPECIMEN SOURCE: ABNORMAL
SUSCEPT TESTING, RAFBI7: ABNORMAL

## 2021-05-18 PROCEDURE — 3331090002 HH PPS REVENUE DEBIT

## 2021-05-18 PROCEDURE — 3331090001 HH PPS REVENUE CREDIT

## 2021-05-19 LAB
ACID FAST STN SPEC: NEGATIVE
AMIKACIN ISLT MIC: ABNORMAL
CIPROFLOXACIN ISLT MIC: ABNORMAL
CLARITHRO ISLT MIC: ABNORMAL
ETHAMBUTOL ISLT MIC: ABNORMAL
LINEZOLID ISLT MIC: ABNORMAL
M AVIUM CMPLX RRNA SPEC QL PROBE: POSITIVE
M GORDONAE RRNA SPEC QL PROBE: ABNORMAL
M KANSASII RRNA SPEC QL PROBE: ABNORMAL
M TB CMPLX RRNA SPEC QL PROBE: NEGATIVE
MICROORGANISM/AGENT SPEC: ABNORMAL
MOXIFLOXACIN ISLT MIC: ABNORMAL
MYCOBACTERIUM SPEC QL CULT: POSITIVE
OTHER, RAFBI6: ABNORMAL
PLEASE NOTE, AFR16: ABNORMAL
RIFAMPIN ISLT MIC: ABNORMAL
SPECIMEN PREPARATION: ABNORMAL
SPECIMEN SOURCE: ABNORMAL
STREPTOMYCIN ISLT MIC: ABNORMAL
SUSCEPT TESTING, RAFBI7: ABNORMAL

## 2021-05-19 PROCEDURE — 3331090001 HH PPS REVENUE CREDIT

## 2021-05-19 PROCEDURE — 3331090002 HH PPS REVENUE DEBIT

## 2021-05-20 ENCOUNTER — HOME CARE VISIT (OUTPATIENT)
Dept: SCHEDULING | Facility: HOME HEALTH | Age: 71
End: 2021-05-20
Payer: MEDICARE

## 2021-05-20 VITALS
DIASTOLIC BLOOD PRESSURE: 72 MMHG | SYSTOLIC BLOOD PRESSURE: 110 MMHG | HEART RATE: 81 BPM | TEMPERATURE: 98 F | RESPIRATION RATE: 18 BRPM

## 2021-05-20 LAB
ACID FAST STN SPEC: NEGATIVE
M AVIUM CMPLX RRNA SPEC QL PROBE: POSITIVE
M GORDONAE RRNA SPEC QL PROBE: ABNORMAL
M KANSASII RRNA SPEC QL PROBE: ABNORMAL
M TB CMPLX RRNA SPEC QL PROBE: NEGATIVE
MYCOBACTERIUM SPEC QL CULT: POSITIVE
OTHER, RAFBI6: ABNORMAL
SPECIMEN PREPARATION: ABNORMAL
SPECIMEN SOURCE: ABNORMAL
SPECIMEN SOURCE: ABNORMAL
SUSCEPT TESTING, RAFBI7: ABNORMAL

## 2021-05-20 PROCEDURE — G0151 HHCP-SERV OF PT,EA 15 MIN: HCPCS

## 2021-05-20 PROCEDURE — 3331090001 HH PPS REVENUE CREDIT

## 2021-05-20 PROCEDURE — 3331090002 HH PPS REVENUE DEBIT

## 2021-05-21 LAB
ACID FAST STN SPEC: NEGATIVE
MYCOBACTERIUM SPEC QL CULT: POSITIVE
SPECIMEN PREPARATION: ABNORMAL
SPECIMEN SOURCE: ABNORMAL

## 2021-05-21 RX ORDER — CARVEDILOL 12.5 MG/1
12.5 TABLET ORAL 2 TIMES DAILY
Qty: 180 TABLET | Refills: 3 | Status: SHIPPED | OUTPATIENT
Start: 2021-05-21 | End: 2021-09-23 | Stop reason: SDUPTHER

## 2021-07-12 ENCOUNTER — PATIENT OUTREACH (OUTPATIENT)
Dept: CASE MANAGEMENT | Age: 71
End: 2021-07-12

## 2021-07-13 NOTE — PROGRESS NOTES
Health  Outreach for Final Follow Up    Health  contacted the Sepsis Bundle patient with bundle ending 7/21/2021. The patient stated that he was doing well but has some episodes os cough primarily in the mornings. The cough  Does have some production that he describes as white to grey and thin. Encouraged the patient to monitor for worsening and respond quickly. Discussed continued access to the [facility] for questions and concerns. The patient voiced his understanding. HC will monitor via chart review.

## 2022-03-18 PROBLEM — N40.1 NOCTURIA ASSOCIATED WITH BENIGN PROSTATIC HYPERPLASIA: Status: ACTIVE | Noted: 2017-09-25

## 2022-03-18 PROBLEM — Z86.010 PERSONAL HISTORY OF COLONIC POLYPS: Status: ACTIVE | Noted: 2020-01-08

## 2022-03-18 PROBLEM — R35.1 NOCTURIA ASSOCIATED WITH BENIGN PROSTATIC HYPERPLASIA: Status: ACTIVE | Noted: 2017-09-25

## 2022-03-19 PROBLEM — K59.01 SLOW TRANSIT CONSTIPATION: Status: ACTIVE | Noted: 2020-01-08

## 2022-03-19 PROBLEM — J96.01 ACUTE RESPIRATORY FAILURE WITH HYPOXIA (HCC): Status: ACTIVE | Noted: 2021-04-20

## 2022-03-19 PROBLEM — I25.5 ISCHEMIC CARDIOMYOPATHY: Status: ACTIVE | Noted: 2017-06-30

## 2022-03-19 PROBLEM — J98.4 CAVITARY LUNG DISEASE: Status: ACTIVE | Noted: 2021-04-16

## 2022-03-19 PROBLEM — R35.0 FREQUENCY OF URINATION: Status: ACTIVE | Noted: 2018-03-26

## 2022-11-03 ENCOUNTER — OFFICE VISIT (OUTPATIENT)
Dept: FAMILY MEDICINE CLINIC | Facility: CLINIC | Age: 72
End: 2022-11-03
Payer: MEDICARE

## 2022-11-03 VITALS
DIASTOLIC BLOOD PRESSURE: 64 MMHG | HEIGHT: 69 IN | SYSTOLIC BLOOD PRESSURE: 112 MMHG | WEIGHT: 164 LBS | BODY MASS INDEX: 24.29 KG/M2 | TEMPERATURE: 98.2 F | OXYGEN SATURATION: 97 % | HEART RATE: 77 BPM

## 2022-11-03 DIAGNOSIS — G62.9 PERIPHERAL POLYNEUROPATHY: ICD-10-CM

## 2022-11-03 DIAGNOSIS — Z95.810 AUTOMATIC IMPLANTABLE CARDIOVERTER-DEFIBRILLATOR IN SITU: ICD-10-CM

## 2022-11-03 DIAGNOSIS — N18.31 STAGE 3A CHRONIC KIDNEY DISEASE (HCC): Primary | ICD-10-CM

## 2022-11-03 DIAGNOSIS — J44.9 CHRONIC OBSTRUCTIVE PULMONARY DISEASE, UNSPECIFIED COPD TYPE (HCC): ICD-10-CM

## 2022-11-03 DIAGNOSIS — E55.9 VITAMIN D DEFICIENCY: ICD-10-CM

## 2022-11-03 DIAGNOSIS — K21.9 GASTROESOPHAGEAL REFLUX DISEASE WITHOUT ESOPHAGITIS: ICD-10-CM

## 2022-11-03 DIAGNOSIS — I25.5 ISCHEMIC CARDIOMYOPATHY: ICD-10-CM

## 2022-11-03 DIAGNOSIS — R35.0 FREQUENT URINATION: ICD-10-CM

## 2022-11-03 DIAGNOSIS — D64.9 NORMOCYTIC ANEMIA: ICD-10-CM

## 2022-11-03 DIAGNOSIS — E78.2 MIXED HYPERLIPIDEMIA: ICD-10-CM

## 2022-11-03 LAB — PSA SERPL-MCNC: 1.3 NG/ML

## 2022-11-03 PROCEDURE — G8427 DOCREV CUR MEDS BY ELIG CLIN: HCPCS | Performed by: STUDENT IN AN ORGANIZED HEALTH CARE EDUCATION/TRAINING PROGRAM

## 2022-11-03 PROCEDURE — 99215 OFFICE O/P EST HI 40 MIN: CPT | Performed by: STUDENT IN AN ORGANIZED HEALTH CARE EDUCATION/TRAINING PROGRAM

## 2022-11-03 PROCEDURE — 1036F TOBACCO NON-USER: CPT | Performed by: STUDENT IN AN ORGANIZED HEALTH CARE EDUCATION/TRAINING PROGRAM

## 2022-11-03 PROCEDURE — 3017F COLORECTAL CA SCREEN DOC REV: CPT | Performed by: STUDENT IN AN ORGANIZED HEALTH CARE EDUCATION/TRAINING PROGRAM

## 2022-11-03 PROCEDURE — 3078F DIAST BP <80 MM HG: CPT | Performed by: STUDENT IN AN ORGANIZED HEALTH CARE EDUCATION/TRAINING PROGRAM

## 2022-11-03 PROCEDURE — G8420 CALC BMI NORM PARAMETERS: HCPCS | Performed by: STUDENT IN AN ORGANIZED HEALTH CARE EDUCATION/TRAINING PROGRAM

## 2022-11-03 PROCEDURE — 1123F ACP DISCUSS/DSCN MKR DOCD: CPT | Performed by: STUDENT IN AN ORGANIZED HEALTH CARE EDUCATION/TRAINING PROGRAM

## 2022-11-03 PROCEDURE — G8484 FLU IMMUNIZE NO ADMIN: HCPCS | Performed by: STUDENT IN AN ORGANIZED HEALTH CARE EDUCATION/TRAINING PROGRAM

## 2022-11-03 PROCEDURE — 3074F SYST BP LT 130 MM HG: CPT | Performed by: STUDENT IN AN ORGANIZED HEALTH CARE EDUCATION/TRAINING PROGRAM

## 2022-11-03 PROCEDURE — 3023F SPIROM DOC REV: CPT | Performed by: STUDENT IN AN ORGANIZED HEALTH CARE EDUCATION/TRAINING PROGRAM

## 2022-11-03 RX ORDER — SIMVASTATIN 40 MG
TABLET ORAL
Qty: 90 TABLET | Refills: 3 | Status: SHIPPED | OUTPATIENT
Start: 2022-11-03

## 2022-11-03 RX ORDER — MEXILETINE HYDROCHLORIDE 200 MG/1
200 CAPSULE ORAL 2 TIMES DAILY
Qty: 180 CAPSULE | Refills: 3 | Status: SHIPPED | OUTPATIENT
Start: 2022-11-03

## 2022-11-03 RX ORDER — RANOLAZINE 500 MG/1
500 TABLET, EXTENDED RELEASE ORAL 2 TIMES DAILY
Qty: 180 TABLET | Refills: 3 | Status: SHIPPED | OUTPATIENT
Start: 2022-11-03

## 2022-11-03 RX ORDER — EZETIMIBE 10 MG/1
TABLET ORAL
Qty: 90 TABLET | Refills: 3 | Status: SHIPPED | OUTPATIENT
Start: 2022-11-03

## 2022-11-03 RX ORDER — AMITRIPTYLINE HYDROCHLORIDE 10 MG/1
10 TABLET, FILM COATED ORAL NIGHTLY
Qty: 90 TABLET | Refills: 3 | Status: SHIPPED | OUTPATIENT
Start: 2022-11-03

## 2022-11-03 RX ORDER — SOTALOL HYDROCHLORIDE 120 MG/1
120 TABLET ORAL 2 TIMES DAILY
Qty: 180 TABLET | Refills: 3 | Status: SHIPPED | OUTPATIENT
Start: 2022-11-03

## 2022-11-03 RX ORDER — PANTOPRAZOLE SODIUM 40 MG/1
TABLET, DELAYED RELEASE ORAL
Qty: 90 TABLET | Refills: 3 | Status: SHIPPED | OUTPATIENT
Start: 2022-11-03

## 2022-11-03 RX ORDER — FLUTICASONE PROPIONATE AND SALMETEROL 250; 50 UG/1; UG/1
1 POWDER RESPIRATORY (INHALATION) 2 TIMES DAILY
Qty: 60 EACH | Refills: 5 | Status: SHIPPED | OUTPATIENT
Start: 2022-11-03

## 2022-11-03 SDOH — ECONOMIC STABILITY: FOOD INSECURITY: WITHIN THE PAST 12 MONTHS, YOU WORRIED THAT YOUR FOOD WOULD RUN OUT BEFORE YOU GOT MONEY TO BUY MORE.: NEVER TRUE

## 2022-11-03 SDOH — ECONOMIC STABILITY: FOOD INSECURITY: WITHIN THE PAST 12 MONTHS, THE FOOD YOU BOUGHT JUST DIDN'T LAST AND YOU DIDN'T HAVE MONEY TO GET MORE.: NEVER TRUE

## 2022-11-03 ASSESSMENT — PATIENT HEALTH QUESTIONNAIRE - PHQ9
SUM OF ALL RESPONSES TO PHQ9 QUESTIONS 1 & 2: 0
SUM OF ALL RESPONSES TO PHQ QUESTIONS 1-9: 0
SUM OF ALL RESPONSES TO PHQ QUESTIONS 1-9: 0
2. FEELING DOWN, DEPRESSED OR HOPELESS: 0
SUM OF ALL RESPONSES TO PHQ QUESTIONS 1-9: 0
SUM OF ALL RESPONSES TO PHQ QUESTIONS 1-9: 0
1. LITTLE INTEREST OR PLEASURE IN DOING THINGS: 0

## 2022-11-03 ASSESSMENT — ANXIETY QUESTIONNAIRES
GAD7 TOTAL SCORE: 0
7. FEELING AFRAID AS IF SOMETHING AWFUL MIGHT HAPPEN: 0
6. BECOMING EASILY ANNOYED OR IRRITABLE: 0
3. WORRYING TOO MUCH ABOUT DIFFERENT THINGS: 0
4. TROUBLE RELAXING: 0
2. NOT BEING ABLE TO STOP OR CONTROL WORRYING: 0
1. FEELING NERVOUS, ANXIOUS, OR ON EDGE: 0
5. BEING SO RESTLESS THAT IT IS HARD TO SIT STILL: 0

## 2022-11-03 ASSESSMENT — SOCIAL DETERMINANTS OF HEALTH (SDOH): HOW HARD IS IT FOR YOU TO PAY FOR THE VERY BASICS LIKE FOOD, HOUSING, MEDICAL CARE, AND HEATING?: NOT HARD AT ALL

## 2022-11-03 NOTE — PROGRESS NOTES
[facility]  Barbara Resterpo  Phone [phone]  Fax:  [phone]    Patient: Dedrick Ruiz  YOB: 1950  Patient Age 71 y.o. Patient sex: male  Medical Record:  [id number]  Visit Date: 11/03/22    Family Practice Clinic Note     Chief Complaint   Patient presents with    New Patient       History of Present Illness: Follows with cardiology for ischemic cardiomyopathy. Defibrillator in place. Per cardiology recommendation keep potassium at 4 and magnesium at 2 or higher. Per chart review patient supposed to be on midodrine for blood pressure however he states he is not taking it. Following with neurosurgery for compression fracture of T12 vertebrae and spinal stenosis of lumbar region without neurogenic claudication. Was seen in ED yesterday after fall. CT head with no acute intracranial abnormality. CT cervical spine with no acute fracture or subluxation. Patient has fallen 6 times in the last year. States he has numbness and pain in his feet and is the reason why he is falling. Instructed to use his walker at all times. We will try amitriptyline for neuropathy. Complaining of peeing every 2 hours with sensation of incomplete bladder emptying. Has appointment with urology next week. Checking PSA today. Wants to speak with urology before starting any medication. Routine follow-up in 6 months with labs at that time or sooner if needed. Allergies: Allergies   Allergen Reactions    Albuterol Sulfate Other (See Comments)     I used it once and woke up in the kitchen floor after my defibrillator went off.      Amiodarone Other (See Comments)     STUMBLES     Eplerenone Hives    Niacin Hives    Nitroglycerin Other (See Comments)     SEVERE HYPOTENSION       Current Medications:   Medications marked \"taking\" at this time:    Current Outpatient Medications:     fluticasone-salmeterol (ADVAIR) 250-50 MCG/ACT AEPB diskus inhaler, Inhale 1 puff into the lungs 2 times daily USE 1 INHALATION TWICE A DAY IN THE MORNING AND EVENING APPROXIMATELY 12 HOURS APART, Disp: 60 each, Rfl: 5    simvastatin (ZOCOR) 40 MG tablet, TAKE 1 TABLET NIGHTLY, Disp: 90 tablet, Rfl: 3    tiotropium (SPIRIVA) 18 MCG inhalation capsule, INHALE THE CONTENTS OF 1 CAPSULE DAILY, Disp: 90 capsule, Rfl: 3    ranolazine (RANEXA) 500 MG extended release tablet, Take 1 tablet by mouth 2 times daily, Disp: 180 tablet, Rfl: 3    mexiletine (MEXITIL) 200 MG capsule, Take 1 capsule by mouth 2 times daily, Disp: 180 capsule, Rfl: 3    ezetimibe (ZETIA) 10 MG tablet, TAKE 1 TABLET DAILY, Disp: 90 tablet, Rfl: 3    pantoprazole (PROTONIX) 40 MG tablet, TAKE 1 TABLET DAILY, Disp: 90 tablet, Rfl: 3    sotalol (BETAPACE) 120 MG tablet, Take 1 tablet by mouth 2 times daily, Disp: 180 tablet, Rfl: 3    vitamin D 25 MCG (1000 UT) CAPS, Take 1 capsule by mouth daily, Disp: 90 capsule, Rfl: 3    amitriptyline (ELAVIL) 10 MG tablet, Take 1 tablet by mouth nightly, Disp: 90 tablet, Rfl: 3    aspirin 81 MG EC tablet, Take 81 mg by mouth daily, Disp: , Rfl:     nitroGLYCERIN (NITROSTAT) 0.4 MG SL tablet, Place under the tongue, Disp: , Rfl:     polyethylene glycol (GLYCOLAX) 17 GM/SCOOP powder, Take 17 g by mouth daily, Disp: , Rfl:     Current Problem List:   Patient Active Problem List   Diagnosis    Gastroesophageal reflux disease without esophagitis    Atherosclerosis of native coronary artery of native heart with stable angina pectoris (HCC)    Nocturia associated with benign prostatic hyperplasia    Personal history of colonic polyps    Pure hypercholesterolemia    Systolic CHF, chronic (HCC)    Cavitary lung disease    Frequency of urination    Ischemic cardiomyopathy    Essential hypertension    Paroxysmal ventricular tachycardia    Slow transit constipation    Automatic implantable cardioverter-defibrillator in situ    Acute respiratory failure with hypoxia (HCC)    COPD (chronic obstructive pulmonary disease) ([facility]    S/P CABG (coronary artery bypass graft)    Normocytic anemia    Stage 3a chronic kidney disease (HCC)    Peripheral polyneuropathy       Social History:   Social History     Tobacco Use    Smoking status: Former     Packs/day: 1.50     Types: Cigarettes     Quit date: 4/15/2001     Years since quittin.5    Smokeless tobacco: Never   Substance Use Topics    Alcohol use: Not Currently       Family History:   Family History   Problem Relation Age of Onset    No Known Problems Paternal Grandfather     No Known Problems Paternal Grandmother     No Known Problems Maternal Grandfather     No Known Problems Maternal Grandmother     No Known Problems Mother     Hypertension Father     Heart Disease Brother     Hypertension Brother     Coronary Art Dis Other         family hx       Surgical History:  Past Surgical History:   Procedure Laterality Date    CATARACT REMOVAL      - RIGHT EYE AND 3- LEFT EYE    HEENT      tooth extraction    ORTHOPEDIC SURGERY Right     foot callus    PACEMAKER      ICD    [place] CARDIAC SURG PROCEDURE UNLIST      cabg x3     VASCULAR SURGERY         ROS  Review of Systems   All other systems reviewed and are negative. Visit Vitals  /64   Pulse 77   Temp 98.2 °F (36.8 °C)   Ht 5' 9\" (1.753 m)   Wt 164 lb (74.4 kg)   SpO2 97%   BMI 24.22 kg/m²       Physical Exam  Physical Exam  Constitutional:       General: He is not in acute distress. Cardiovascular:      Rate and Rhythm: Normal rate and regular rhythm. Heart sounds: No murmur heard. Pulmonary:      Breath sounds: Normal breath sounds. No wheezing. Abdominal:      Palpations: Abdomen is soft. Tenderness: There is no abdominal tenderness. Musculoskeletal:         General: No signs of injury. Skin:     General: Skin is dry. Neurological:      Mental Status: He is alert. Mental status is at baseline. ASSESSMENT & PLAN  Harmony Cortes was seen today for new patient.     Diagnoses and all orders for this visit:        Stage 3a chronic kidney disease ([facility]  - Stable. Continue to monitor. Avoid nephrotoxic agents. Gastroesophageal reflux disease without esophagitis  - Stable. Asymptomatic.  - Continue pantoprazole 40 mg daily    Normocytic anemia    Chronic obstructive pulmonary disease, unspecified COPD type ([facility]  - Stable. - Continue Advair   - Continue Spiriva daily    Ischemic cardiomyopathy  - Following with cardiology; s/p CABG x3 2001  - Continue Ranexa 500 mg twice daily  - Continue aspirin 81 mg daily  - Continue Zetia 10 mg daily  - Continue Mexitil 200 mg twice daily  - Continue simvastatin 40 mg daily  - Continue sotalol 120 mg twice daily    Automatic implantable cardioverter-defibrillator in situ    Peripheral neuropathy  - Numbness and tingling in his feet. States it is the reason why he is falling. Has fallen 6 times last year. We will start amitriptyline today for neuropathy.  - Start amitriptyline 10 mg nightly      I have reviewed the patient's past medical history, social history and family history and vitals. We have discussed treatment plan and follow up and given patient instructions. Patient's questions are answered and we will follow up as indicated. On this date 11/3/2022 I have spent 40 minutes reviewing previous notes, test results and face to face with the patient discussing the diagnosis and importance of compliance with the treatment plan as well as documenting on the day of the visit.     Sen Dow, DO

## 2022-11-04 LAB
CHOLEST SERPL-MCNC: 122 MG/DL
HDLC SERPL-MCNC: 49 MG/DL (ref 40–60)
HDLC SERPL: 2.5 {RATIO}
LDLC SERPL CALC-MCNC: 50 MG/DL
TRIGL SERPL-MCNC: 115 MG/DL (ref 35–150)
VLDLC SERPL CALC-MCNC: 23 MG/DL (ref 6–23)

## 2023-01-04 NOTE — PROGRESS NOTES
Pt resting in bed comfortably at this time, alert and oriented times 4. No distress noted, respirations even and unlabored on 2 L NC. Pt denies pain at this time. Pt instructed to call for assistance if needed, call light in place, will continue to monitor. See Initial Evaluation and plan of care.